# Patient Record
Sex: FEMALE | Race: WHITE | Employment: OTHER | URBAN - METROPOLITAN AREA
[De-identification: names, ages, dates, MRNs, and addresses within clinical notes are randomized per-mention and may not be internally consistent; named-entity substitution may affect disease eponyms.]

---

## 2017-11-27 ENCOUNTER — TELEPHONE (OUTPATIENT)
Dept: DERMATOLOGY | Facility: CLINIC | Age: 82
End: 2017-11-27

## 2017-11-27 NOTE — TELEPHONE ENCOUNTER
Reason for Call:  Other appointment    Detailed comments: Pt thinks she has basal cell spot on face, needs to be seen , no openings until Jan. , leaving [facility] end December, please call to advise, she is on waiting list    Phone Number Patient can be reached at: Cell number on file:    Telephone Information:   Mobile [phone]       Best Time: today    Can we leave a detailed message on this number? YES    Call taken on 11/27/2017 at 10:59 AM by Zeenat Crabtree

## 2017-11-27 NOTE — TELEPHONE ENCOUNTER
"Tried to reach patient at number below but got a message stating: \"The person you are trying to reach is not accepting calls at this time-please try your call again later..\"    Will try to work patient in but little work in openings available, may need to wait until she returns for appt. Patient did not schedule an appt and has been on wait list 2 weeks now. Diane Rai RN    "

## 2017-11-29 ENCOUNTER — OFFICE VISIT (OUTPATIENT)
Dept: DERMATOLOGY | Facility: CLINIC | Age: 82
End: 2017-11-29
Payer: COMMERCIAL

## 2017-11-29 VITALS — SYSTOLIC BLOOD PRESSURE: 159 MMHG | OXYGEN SATURATION: 98 % | DIASTOLIC BLOOD PRESSURE: 95 MMHG | HEART RATE: 60 BPM

## 2017-11-29 DIAGNOSIS — L81.4 LENTIGO: ICD-10-CM

## 2017-11-29 DIAGNOSIS — L82.0 INFLAMED SEBORRHEIC KERATOSIS: ICD-10-CM

## 2017-11-29 DIAGNOSIS — Z85.828 HISTORY OF SKIN CANCER: ICD-10-CM

## 2017-11-29 DIAGNOSIS — L57.0 AK (ACTINIC KERATOSIS): Primary | ICD-10-CM

## 2017-11-29 DIAGNOSIS — L82.1 SK (SEBORRHEIC KERATOSIS): ICD-10-CM

## 2017-11-29 PROCEDURE — 17000 DESTRUCT PREMALG LESION: CPT | Mod: 59 | Performed by: DERMATOLOGY

## 2017-11-29 PROCEDURE — 99213 OFFICE O/P EST LOW 20 MIN: CPT | Mod: 25 | Performed by: DERMATOLOGY

## 2017-11-29 PROCEDURE — 17110 DESTRUCTION B9 LES UP TO 14: CPT | Performed by: DERMATOLOGY

## 2017-11-29 NOTE — LETTER
11/29/2017         RE: Ntahalie Betts  [address]  [facility] [address]        Dear Colleague,    Thank you for referring your patient, Nathalie Betts, to the [facility]. Please see a copy of my visit note below.    Nathalie Betts is a 82 year old year old female patient here today for f/u hx of non-melanoma skin cancer.  Today she notes rouhg spot on right temple nad l thigh.   .  Patient states this has been present for a while.  Patient reports the following symptoms:  rough.  Patient reports the following previous treatments none.  Patient reports the following modifying factors none.  Associated symptoms: none.  Patient has no other skin complaints today.  Remainder of the HPI, Meds, PMH, Allergies, FH, and SH was reviewed in chart.    Pertinent Hx:   Non-melanoma skin cancer   Past Medical History:   Diagnosis Date     Squamous cell carcinoma        History reviewed. No pertinent surgical history.     Family History   Problem Relation Age of Onset     Melanoma No family hx of        Social History     Social History     Marital status:      Spouse name: N/A     Number of children: N/A     Years of education: N/A     Occupational History     Not on file.     Social History Main Topics     Smoking status: Former Smoker     Smokeless tobacco: Never Used     Alcohol use Not on file     Drug use: Not on file     Sexual activity: Not on file     Other Topics Concern     Not on file     Social History Narrative       Outpatient Encounter Prescriptions as of 11/29/2017   Medication Sig Dispense Refill     valsartan (DIOVAN) 160 MG tablet   0     simvastatin (ZOCOR) 80 MG tablet   1     omeprazole (PRILOSEC) 20 MG capsule   1     labetalol (NORMODYNE) 100 MG tablet   1     DULoxetine (CYMBALTA) 60 MG capsule   1     doxazosin (CARDURA) 4 MG tablet   1     ALPRAZolam (XANAX) 0.25 MG tablet   0     multivitamin  with lutein (OCUVITE WITH LTEIN) CAPS Take 1 capsule by mouth daily        spironolactone (ALDACTONE) 25 MG tablet   2     No facility-administered encounter medications on file as of 11/29/2017.              Review Of Systems  Skin: As above  Eyes: negative  Ears/Nose/Throat: negative  Respiratory: No shortness of breath, dyspnea on exertion, cough, or hemoptysis  Cardiovascular: negative  Gastrointestinal: negative  Genitourinary: negative  Musculoskeletal: negative  Neurologic: negative  Psychiatric: negative  Hematologic/Lymphatic/Immunologic: negative  Endocrine: negative      O:   NAD, WDWN, Alert & Oriented, Mood & Affect wnl, Vitals stable   Here today alone   BP (!) 159/95  Pulse 60  SpO2 98%   General appearance normal   Vitals stable   Alert, oriented and in no acute distress      Following lymph nodes palpated: Occipital, Cervical, Supraclavicular no lad   Stuck on papules and brown macules on trunk and ext    R temple gritty papule   L thigh inflamed seborrheic keratosis         The remainder of the full exam was unremarkable; the following areas were examined:  conjunctiva/lids, oral mucosa, neck, peripheral vascular system, abdomen, lymph nodes, digits/nails, eccrine and apocrine glands, scalp/hair, face, neck, chest, abdomen, buttocks, back, RUE, LUE, RLE, LLE       Eyes: Conjunctivae/lids:Normal     ENT: Lips, buccal mucosa, tongue: normal    MSK:Normal    Cardiovascular: peripheral edema none    Pulm: Breathing Normal    Lymph Nodes: No Head and Neck Lymphadenopathy     Neuro/Psych: Orientation:Normal; Mood/Affect:Normal      A/P:  1. Hx of non-melanoma skin cancer, seborrheic keratosis, lentigo  2. r temple actinic keratosis   LN2:  Treated with LN2 for 5s for 1-2 cycles. Warned risks of blistering, pain, pigment change, scarring, and incomplete resolution.  Advised patient to return if lesions do not completely resolve.  Wound care sheet given.  3. L thigh inflamed seborrheic keratosis   LN2:  Treated with LN2 for 5s for 1-2 cycles. Warned risks of blistering,  pain, pigment change, scarring, and incomplete resolution.  Advised patient to return if lesions do not completely resolve.  Wound care sheet given.    BENIGN LESIONS DISCUSSED WITH PATIENT:  I discussed the specifics of tumor, prognosis, and genetics of benign lesions.  I explained that treatment of these lesions would be purely cosmetic and not medically neccessary.  I discussed with patient different removal options including excision, cautery and /or laser.      Nature and genetics of benign skin lesions dicussed with patient.  Signs and Symptoms of skin cancer discussed with patient.  Patient encouraged to perform monthly skin exams.  UV precautions reviewed with patient.  Skin care regimen reviewed with patient: Eliminate harsh soaps, i.e. Dial, zest, irsih spring; Mild soaps such as Cetaphil or Dove sensitive skin, avoid hot or cold showers, aggressive use of emollients including vanicream, cetaphil or cerave discussed with patient.    Risks of non-melanoma skin cancer discussed with patient   Return to clinic 12 months      Again, thank you for allowing me to participate in the care of your patient.        Sincerely,        Cristian Nixon MD

## 2017-11-29 NOTE — MR AVS SNAPSHOT
After Visit Summary   11/29/2017    Nathalie Betts    MRN: [id number]           Patient Information     Date Of Birth          1935        Visit Information        Provider Department      11/29/2017 11:45 AM Cristian Nixon MD [facility]        Care Instructions    WOUND CARE INSTRUCTIONS   FOR CRYOSURGERY   Face & leg  This area treated with liquid nitrogen will form a blister. You do not need to bandage the area until after the blister forms and breaks (which may be a few days). When the blister breaks, begin daily dressing changes as follows:   1) Clean and dry the area with tap water using clean Q-tip or sterile gauze pad.   2) Apply Polysporin ointment or Bacitracin ointment over entire wound. Do NOT use Neosporin ointment.   3) Cover the wound with a band-aid or sterile non-stick gauze pad and micropore paper tape.   REPEAT THESE INSTRUCTIONS AT LEAST ONCE A DAY UNTIL THE WOUND HAS COMPLETELY HEALED.   It is an old wives tale that a wound heals better when it is exposed to air and allowed to dry out. The wound will heal faster with a better cosmetic result if it is kept moist with ointment and covered with a bandage.   Do not let the wound dry out.   IMPORTANT INFORMATION ON REVERSE SIDE   Supplies Needed:   *Cotton tipped applicators (Q-tips)   *Polysporin ointment or Bacitracin ointment (NOT NEOSPORIN)   *Band-aids, or non stick gauze pads and micropore paper tape   PATIENT INFORMATION   During the healing process you will notice a number of changes. All wounds develop a small halo of redness surrounding the wound. This means healing is occurring. Severe itching with extensive redness usually indicates sensitivity to the ointment or bandage tape used to dress the wound. You should call our office if this develops.   Swelling and/or discoloration around your surgical site is common, particularly when performed around the eye.   All wounds normally drain. The  larger the wound the more drainage there will be. After 7-10 days, you will notice the wound beginning to shrink and new skin will begin to grow. The wound is healed when you can see skin has formed over the entire area. A healed wound has a healthy, shiny look to the surface and is red to dark pink in color to normalize. Wounds may take approximately 4-6 weeks to heal. Larger wounds may take 6-8 weeks. After the wound is healed you may discontinue dressing changes.   You may experience a sensation of tightness as your wound heals. This is normal and will gradually subside.   Your healed wound may be sensitive to temperature changes. This sensitivity improves with time, but if you re having a lot of discomfort, try to avoid temperature extremes.   Patients frequently experience itching after their wound appears to have healed because of the continue healing under the skin. Plain Vaseline will help relieve the itching.               Follow-ups after your visit        Who to contact     If you have questions or need follow up information about today's clinic visit or your schedule please contact [facility] directly at [phone].  Normal or non-critical lab and imaging results will be communicated to you by [email or web address], letter or phone within 4 business days after the clinic has received the results. If you do not hear from us within 7 days, please contact the clinic through [employer] or phone. If you have a critical or abnormal lab result, we will notify you by phone as soon as possible.  Submit refill requests through [employer] or call your pharmacy and they will forward the refill request to us. Please allow 3 business days for your refill to be completed.          Additional Information About Your Visit        [employer] Information     [facility] lets you send messages to your doctor, view your test results, renew your prescriptions, schedule appointments and more. To sign up, go to [email or web address] .  "Click on \"Log in\" on the left side of the screen, which will take you to the Welcome page. Then click on \"Sign up Now\" on the right side of the page.     You will be asked to enter the access code listed below, as well as some personal information. Please follow the directions to create your username and password.     Your access code is: [email or web address]  Expires: 2018 12:06 PM     Your access code will  in 90 days. If you need help or a new code, please call your [facility] clinic or [phone].        Care EveryWhere ID     This is your Care EveryWhere ID. This could be used by other organizations to access your [facility] medical records  [id number]        Your Vitals Were     Pulse Pulse Oximetry                60 98%           Blood Pressure from Last 3 Encounters:   17 (!) 159/95   16 149/83   12/08/15 144/88    Weight from Last 3 Encounters:   No data found for Wt              Today, you had the following     No orders found for display       Primary Care Provider Office Phone # Fax #    Christine Escalera [phone]       [place] [id number]        Equal Access to Services     KOBE WEBB : Hadii aad ku hadasho Soomaali, waaxda luqadaha, qaybta kaalmada adeegyada, waxay lizette katen smith vegas. So [facility] [phone].    ATENCIÓN: Si habla español, tiene a garnett disposición servicios gratuitos de asistencia lingüística. Llame al [phone].    We comply with applicable federal civil rights laws and [place] laws. We do not discriminate on the basis of race, color, national origin, age, disability, sex, sexual orientation, or gender identity.            Thank you!     Thank you for choosing [facility]  for your care. Our goal is always to provide you with excellent care. Hearing back from our patients is one way we can continue to improve our services. Please take a few minutes to complete the " written survey that you may receive in the mail after your visit with us. Thank you!             Your Updated Medication List - Protect others around you: Learn how to safely use, store and throw away your medicines at www.disposemymeds.org.          This list is accurate as of: 11/29/17 12:06 PM.  Always use your most recent med list.                   Brand Name Dispense Instructions for use Diagnosis    ALPRAZolam 0.25 MG tablet    XANAX          doxazosin 4 MG tablet    CARDURA          DULoxetine 60 MG EC capsule    CYMBALTA          labetalol 100 MG tablet    NORMODYNE          multivitamin  with lutein Caps per capsule      Take 1 capsule by mouth daily        omeprazole 20 MG CR capsule    priLOSEC          simvastatin 80 MG tablet    ZOCOR          spironolactone 25 MG tablet    ALDACTONE          valsartan 160 MG tablet    DIOVAN

## 2017-11-29 NOTE — PATIENT INSTRUCTIONS
WOUND CARE INSTRUCTIONS   FOR CRYOSURGERY   Face & leg  This area treated with liquid nitrogen will form a blister. You do not need to bandage the area until after the blister forms and breaks (which may be a few days). When the blister breaks, begin daily dressing changes as follows:   1) Clean and dry the area with tap water using clean Q-tip or sterile gauze pad.   2) Apply Polysporin ointment or Bacitracin ointment over entire wound. Do NOT use Neosporin ointment.   3) Cover the wound with a band-aid or sterile non-stick gauze pad and micropore paper tape.   REPEAT THESE INSTRUCTIONS AT LEAST ONCE A DAY UNTIL THE WOUND HAS COMPLETELY HEALED.   It is an old wives tale that a wound heals better when it is exposed to air and allowed to dry out. The wound will heal faster with a better cosmetic result if it is kept moist with ointment and covered with a bandage.   Do not let the wound dry out.   IMPORTANT INFORMATION ON REVERSE SIDE   Supplies Needed:   *Cotton tipped applicators (Q-tips)   *Polysporin ointment or Bacitracin ointment (NOT NEOSPORIN)   *Band-aids, or non stick gauze pads and micropore paper tape   PATIENT INFORMATION   During the healing process you will notice a number of changes. All wounds develop a small halo of redness surrounding the wound. This means healing is occurring. Severe itching with extensive redness usually indicates sensitivity to the ointment or bandage tape used to dress the wound. You should call our office if this develops.   Swelling and/or discoloration around your surgical site is common, particularly when performed around the eye.   All wounds normally drain. The larger the wound the more drainage there will be. After 7-10 days, you will notice the wound beginning to shrink and new skin will begin to grow. The wound is healed when you can see skin has formed over the entire area. A healed wound has a healthy, shiny look to the surface and is red to dark pink in color to  normalize. Wounds may take approximately 4-6 weeks to heal. Larger wounds may take 6-8 weeks. After the wound is healed you may discontinue dressing changes.   You may experience a sensation of tightness as your wound heals. This is normal and will gradually subside.   Your healed wound may be sensitive to temperature changes. This sensitivity improves with time, but if you re having a lot of discomfort, try to avoid temperature extremes.   Patients frequently experience itching after their wound appears to have healed because of the continue healing under the skin. Plain Vaseline will help relieve the itching.

## 2017-11-29 NOTE — PROGRESS NOTES
Nathalie Betts is a 82 year old year old female patient here today for f/u hx of non-melanoma skin cancer.  Today she notes rouhg spot on right temple nad l thigh.   .  Patient states this has been present for a while.  Patient reports the following symptoms:  rough.  Patient reports the following previous treatments none.  Patient reports the following modifying factors none.  Associated symptoms: none.  Patient has no other skin complaints today.  Remainder of the HPI, Meds, PMH, Allergies, FH, and SH was reviewed in chart.    Pertinent Hx:   Non-melanoma skin cancer   Past Medical History:   Diagnosis Date     Squamous cell carcinoma        History reviewed. No pertinent surgical history.     Family History   Problem Relation Age of Onset     Melanoma No family hx of        Social History     Social History     Marital status:      Spouse name: N/A     Number of children: N/A     Years of education: N/A     Occupational History     Not on file.     Social History Main Topics     Smoking status: Former Smoker     Smokeless tobacco: Never Used     Alcohol use Not on file     Drug use: Not on file     Sexual activity: Not on file     Other Topics Concern     Not on file     Social History Narrative       Outpatient Encounter Prescriptions as of 11/29/2017   Medication Sig Dispense Refill     valsartan (DIOVAN) 160 MG tablet   0     simvastatin (ZOCOR) 80 MG tablet   1     omeprazole (PRILOSEC) 20 MG capsule   1     labetalol (NORMODYNE) 100 MG tablet   1     DULoxetine (CYMBALTA) 60 MG capsule   1     doxazosin (CARDURA) 4 MG tablet   1     ALPRAZolam (XANAX) 0.25 MG tablet   0     multivitamin  with lutein (OCUVITE WITH LTEIN) CAPS Take 1 capsule by mouth daily       spironolactone (ALDACTONE) 25 MG tablet   2     No facility-administered encounter medications on file as of 11/29/2017.              Review Of Systems  Skin: As above  Eyes: negative  Ears/Nose/Throat: negative  Respiratory: No shortness of  breath, dyspnea on exertion, cough, or hemoptysis  Cardiovascular: negative  Gastrointestinal: negative  Genitourinary: negative  Musculoskeletal: negative  Neurologic: negative  Psychiatric: negative  Hematologic/Lymphatic/Immunologic: negative  Endocrine: negative      O:   NAD, WDWN, Alert & Oriented, Mood & Affect wnl, Vitals stable   Here today alone   BP (!) 159/95  Pulse 60  SpO2 98%   General appearance normal   Vitals stable   Alert, oriented and in no acute distress      Following lymph nodes palpated: Occipital, Cervical, Supraclavicular no lad   Stuck on papules and brown macules on trunk and ext    R temple gritty papule   L thigh inflamed seborrheic keratosis         The remainder of the full exam was unremarkable; the following areas were examined:  conjunctiva/lids, oral mucosa, neck, peripheral vascular system, abdomen, lymph nodes, digits/nails, eccrine and apocrine glands, scalp/hair, face, neck, chest, abdomen, buttocks, back, RUE, LUE, RLE, LLE       Eyes: Conjunctivae/lids:Normal     ENT: Lips, buccal mucosa, tongue: normal    MSK:Normal    Cardiovascular: peripheral edema none    Pulm: Breathing Normal    Lymph Nodes: No Head and Neck Lymphadenopathy     Neuro/Psych: Orientation:Normal; Mood/Affect:Normal      A/P:  1. Hx of non-melanoma skin cancer, seborrheic keratosis, lentigo  2. r temple actinic keratosis   LN2:  Treated with LN2 for 5s for 1-2 cycles. Warned risks of blistering, pain, pigment change, scarring, and incomplete resolution.  Advised patient to return if lesions do not completely resolve.  Wound care sheet given.  3. L thigh inflamed seborrheic keratosis   LN2:  Treated with LN2 for 5s for 1-2 cycles. Warned risks of blistering, pain, pigment change, scarring, and incomplete resolution.  Advised patient to return if lesions do not completely resolve.  Wound care sheet given.    BENIGN LESIONS DISCUSSED WITH PATIENT:  I discussed the specifics of tumor, prognosis, and  genetics of benign lesions.  I explained that treatment of these lesions would be purely cosmetic and not medically neccessary.  I discussed with patient different removal options including excision, cautery and /or laser.      Nature and genetics of benign skin lesions dicussed with patient.  Signs and Symptoms of skin cancer discussed with patient.  Patient encouraged to perform monthly skin exams.  UV precautions reviewed with patient.  Skin care regimen reviewed with patient: Eliminate harsh soaps, i.e. Dial, zest, irsih spring; Mild soaps such as Cetaphil or Dove sensitive skin, avoid hot or cold showers, aggressive use of emollients including vanicream, cetaphil or cerave discussed with patient.    Risks of non-melanoma skin cancer discussed with patient   Return to clinic 12 months

## 2020-02-27 ENCOUNTER — NEW PATIENT (OUTPATIENT)
Dept: URBAN - METROPOLITAN AREA CLINIC 60 | Facility: CLINIC | Age: 85
End: 2020-02-27
Payer: MEDICARE

## 2020-02-27 DIAGNOSIS — H35.3132 NONEXUDATIVE MACULAR DEGENERATION, INTERMEDIATE DRY STAGE, BILATERAL: Primary | ICD-10-CM

## 2020-02-27 DIAGNOSIS — Z96.1 PRESENCE OF INTRAOCULAR LENS: ICD-10-CM

## 2020-02-27 DIAGNOSIS — H40.013 OPEN ANGLE WITH BORDERLINE FINDINGS, LOW RISK, BILATERAL: ICD-10-CM

## 2020-02-27 DIAGNOSIS — H43.813 VITREOUS DEGENERATION, BILATERAL: ICD-10-CM

## 2020-02-27 PROCEDURE — 92004 COMPRE OPH EXAM NEW PT 1/>: CPT | Performed by: OPTOMETRIST

## 2020-02-27 PROCEDURE — 92134 CPTRZ OPH DX IMG PST SGM RTA: CPT | Performed by: OPTOMETRIST

## 2020-02-27 PROCEDURE — 92133 CPTRZD OPH DX IMG PST SGM ON: CPT | Performed by: OPTOMETRIST

## 2020-02-27 ASSESSMENT — INTRAOCULAR PRESSURE
OS: 18
OD: 15

## 2020-02-27 ASSESSMENT — VISUAL ACUITY
OD: 20/30
OS: 20/40

## 2021-08-31 ENCOUNTER — LAB REQUISITION (OUTPATIENT)
Dept: LAB | Facility: CLINIC | Age: 86
End: 2021-08-31
Payer: MEDICARE

## 2021-08-31 DIAGNOSIS — N39.0 URINARY TRACT INFECTION, SITE NOT SPECIFIED: ICD-10-CM

## 2021-08-31 PROBLEM — F41.9 ANXIETY AND DEPRESSION: Status: ACTIVE | Noted: 2019-08-10

## 2021-08-31 PROBLEM — S52.90XA RADIAL FRACTURE: Status: ACTIVE | Noted: 2021-08-21

## 2021-08-31 PROBLEM — M19.90 OSTEOARTHRITIS: Status: ACTIVE | Noted: 2021-08-31

## 2021-08-31 PROBLEM — I77.9 AORTA DISORDER (H): Status: ACTIVE | Noted: 2020-07-21

## 2021-08-31 PROBLEM — I47.10 SUPRAVENTRICULAR TACHYCARDIA (H): Status: ACTIVE | Noted: 2020-07-21

## 2021-08-31 PROBLEM — Z86.73 HISTORY OF CVA (CEREBROVASCULAR ACCIDENT): Status: ACTIVE | Noted: 2021-07-14

## 2021-08-31 PROBLEM — O36.0190 ANTI-D ANTIBODIES PRESENT: Status: ACTIVE | Noted: 2021-04-26

## 2021-08-31 PROBLEM — F99 PSYCHIATRIC PROBLEM: Status: ACTIVE | Noted: 2018-01-16

## 2021-08-31 PROBLEM — G47.30 SLEEP APNEA: Status: ACTIVE | Noted: 2017-09-05

## 2021-08-31 PROBLEM — K57.30 DIVERTICULOSIS OF COLON: Status: ACTIVE | Noted: 2021-08-31

## 2021-08-31 PROBLEM — S22.39XA RIB FRACTURE: Status: ACTIVE | Noted: 2021-08-21

## 2021-08-31 PROBLEM — E21.0 PRIMARY HYPERPARATHYROIDISM (H): Status: ACTIVE | Noted: 2021-08-07

## 2021-08-31 PROBLEM — I20.89 STABLE ANGINA (H): Status: ACTIVE | Noted: 2021-01-18

## 2021-08-31 PROBLEM — K21.9 GASTROESOPHAGEAL REFLUX DISEASE: Status: ACTIVE | Noted: 2017-09-05

## 2021-08-31 PROBLEM — K82.9 GALLBLADDER DISEASE: Status: ACTIVE | Noted: 2017-09-05

## 2021-08-31 PROBLEM — I63.9 ACUTE ISCHEMIC STROKE (H): Status: ACTIVE | Noted: 2021-05-02

## 2021-08-31 PROBLEM — M16.11 OSTEOARTHRITIS OF RIGHT HIP: Status: ACTIVE | Noted: 2017-10-06

## 2021-08-31 PROBLEM — I27.20 PULMONARY HYPERTENSION (H): Status: ACTIVE | Noted: 2017-09-25

## 2021-08-31 PROBLEM — F33.0 MILD EPISODE OF RECURRENT MAJOR DEPRESSIVE DISORDER (H): Status: ACTIVE | Noted: 2020-07-21

## 2021-08-31 PROBLEM — K44.9 HIATAL HERNIA: Status: ACTIVE | Noted: 2017-09-05

## 2021-08-31 PROBLEM — F32.A ANXIETY AND DEPRESSION: Status: ACTIVE | Noted: 2019-08-10

## 2021-08-31 PROBLEM — K83.8 DILATION OF BILIARY TRACT: Status: ACTIVE | Noted: 2021-08-24

## 2021-08-31 PROBLEM — H53.9 VISUAL DISTURBANCE: Status: ACTIVE | Noted: 2017-09-05

## 2021-08-31 PROBLEM — M48.00 SPINAL STENOSIS: Status: ACTIVE | Noted: 2017-09-05
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
## 2021-08-31 PROBLEM — R52 UNCONTROLLED PAIN: Status: ACTIVE | Noted: 2021-08-24

## 2021-08-31 PROBLEM — M41.9 SCOLIOSIS: Status: ACTIVE | Noted: 2017-09-05

## 2021-08-31 NOTE — PROGRESS NOTES
[facility] GERIATRIC SERVICES  Primary Care Provider & Clinic: LEX DEMPSEY MD, [facility] [address] / [place] [address]; Phone: [phone]  Chief Complaint   Patient presents with     Hospital F/U     [facility] 08/24/2021 - 08/26/2021; 08/21/2021 - 08/22/2021     [facility] Medical Record Number: [id number]  Place of Service Where Encounter Took Place: [facility] AT [facility] ([facility]) [[id number]]    Nathalie Betts is a 86 year old (1935), admitted to the above facility from  [facility]. Hospital stay 8/24/21 through 8/26/21.    HPI:    Patient with a fall at home on 8/25/21 and sustained left sided rib fractures. TCU was recommended patient patient declined. Patient returned to the ER due to severe pain and inability to care for herself.     Patient had a CVA May '21. Has completed in patient and home therapy. Recent cognitive testing with [facility] 28/30.     Staff report patient rating pain moderate to severe and asking to increase oxycodone frequency. Patient saw ortho one day ago and left wrist casted. Today patient rating pain 7/10 on left side/ribs. No dyspnea or cough. Patient notes increased constipation with narcotics, no BM x 3 days. Denies abd pain.     CODE STATUS/ADVANCE DIRECTIVES DISCUSSION: No Order - CPR/Full code   Patient's living condition: lives alone  ALLERGIES:   Allergies   Allergen Reactions     Penicillins Anaphylaxis     Atorvastatin Muscle Pain (Myalgia)     Diatrizoate Other (See Comments)     Elevated BP     Mushroom Unknown     Nifedipine Hives      PAST MEDICAL HISTORY:   Past Medical History:   Diagnosis Date     Squamous cell carcinoma       PAST SURGICAL HISTORY:  has no past surgical history on file.  FAMILY HISTORY: family history is not on file.  SOCIAL HISTORY:  reports that she has quit smoking. She has never used smokeless tobacco.    Post Discharge Medication Reconciliation Status: discharge medications  reconciled, continue medications without change  Current Outpatient Medications   Medication Sig     acetaminophen (TYLENOL) 500 MG tablet Take 1,000 mg by mouth 3 times daily     ALPRAZolam (XANAX) 0.25 MG tablet Take 0.5 mg by mouth 2 times daily      amLODIPine (NORVASC) 10 MG tablet Take 10 mg by mouth daily     apixaban ANTICOAGULANT (ELIQUIS) 5 MG tablet Take 5 mg by mouth 2 times daily     baclofen (LIORESAL) 10 MG tablet Take 10 mg by mouth every 8 hours as needed for muscle spasms     calcium carbonate (OS-MARY) 1500 (600 Ca) MG tablet Take 600 mg by mouth daily     carboxymethylcellulose PF (REFRESH PLUS) 0.5 % ophthalmic solution Place 1 drop into both eyes daily as needed for dry eyes     carvedilol (COREG) 3.125 MG tablet Take 3.125 mg by mouth 2 times daily (with meals)     clindamycin (CLEOCIN) 150 MG capsule Take 600 mg by mouth once as needed Take 4 capsules (600 mg) 1 hour prior to dental appointment     DULoxetine (CYMBALTA) 60 MG capsule Take 60 mg by mouth daily      Lidocaine (LIDOCARE) 4 % Patch Place 1 patch onto the skin every 24 hours To prevent lidocaine toxicity, patient should be patch free for 12 hrs daily.     losartan (COZAAR) 100 MG tablet Take 100 mg by mouth daily     Lutein 20 MG CAPS Take 1 capsule by mouth 2 times daily     melatonin 5 MG tablet Take 10 mg by mouth At Bedtime     mirtazapine (REMERON) 15 MG tablet Take 15 mg by mouth At Bedtime     Multiple Vitamins-Minerals (CENTRUM WOMEN) TABS Take 1 tablet by mouth daily     nitroGLYcerin (NITROSTAT) 0.4 MG sublingual tablet Place 0.4 mg under the tongue every 5 minutes as needed for chest pain For chest pain place 1 tablet under the tongue every 5 minutes for 3 doses. If symptoms persist 5 minutes after 1st dose call 911.     omeprazole (PRILOSEC) 20 MG capsule Take 20 mg by mouth daily before breakfast      oxyCODONE (ROXICODONE) 5 MG tablet Take 2.5 mg by mouth every 6 hours as needed for pain     polyethylene glycol 3350  POWD Take 17 g by mouth daily as needed     rosuvastatin (CRESTOR) 40 MG tablet Take 40 mg by mouth At Bedtime     Vitamin D, Cholecalciferol, 25 MCG (1000 UT) TABS Take 1 tablet by mouth daily     No current facility-administered medications for this visit.     ROS:  4 point ROS including Respiratory, CV, GI and , other than that noted in the HPI,  is negative    Vitals:  BP (!) 159/91   Pulse 66   Temp 97.3  F (36.3  C)   Resp 18   Ht 1.524 m (5')   Wt 67.9 kg (149 lb 12.8 oz)   SpO2 98%   BMI 29.26 kg/m    Exam:  GENERAL APPEARANCE:  Alert, in no distress  RESP:  lungs clear to auscultation , no respiratory distress  CV:  regular rate and rhythm, no murmur, rub, or gallop  ABDOMEN:  normal bowel sounds, soft, nontender, no hepatosplenomegaly or other masses  SKIN:  left arm in cast, cms to fingers intact  PSYCH:  oriented X 3, affect and mood normal    Lab/Diagnostic data:  Recent labs in [facility] reviewed by me today.     ASSESSMENT/PLAN:  Closed fracture of distal end of left radius with routine healing, unspecified fracture morphology, subsequent encounter  Closed fracture of one rib of left side with routine healing, subsequent encounter  - saw orto one day ago, cast to left forearm  - having rib pain,continue scheduled tylenol. Will increse oxycodone from 2.5 to 5 mg Q6 prn due to uncontrolled pain  - patient working with PT    Anxiety and depression  - chronic. Continue xanax, Cymbalta and remeron    Gastroesophageal reflux disease with esophagitis without hemorrhage  - sx's stable, continue PPI    History of CVA (cerebrovascular accident)  - following with neurology  - treated with TPA initially, then dual antiplatelet therapy. Afib noted in June (now in SR), patient to continue eliquis and asa per neurology    Stable angina (H)  Essential hypertension  - has used ntg 1-2 times in the last year  -  Continue coreg and norvasc  - staff to update with any reports of CP    Slow transit constipation  - due  to narcotics, patient has miralax available, she does not want to have bowel meds scheduled at this time    Electronically signed by: BETHANY Armas CNP

## 2021-09-01 LAB
ALBUMIN UR-MCNC: NORMAL G/DL
APPEARANCE UR: NORMAL
BILIRUB UR QL STRIP: NORMAL
COLOR UR AUTO: NORMAL
GLUCOSE UR STRIP-MCNC: NORMAL MG/DL
HGB UR QL STRIP: NORMAL
KETONES UR STRIP-MCNC: NORMAL MG/DL
LEUKOCYTE ESTERASE UR QL STRIP: NORMAL
MUCOUS THREADS #/AREA URNS LPF: NORMAL /[LPF]
NITRATE UR QL: NORMAL
PH UR STRIP: NORMAL [PH]
RBC URINE: NORMAL
SP GR UR STRIP: NORMAL
SQUAMOUS EPITHELIAL: NORMAL
UROBILINOGEN UR STRIP-MCNC: NORMAL MG/DL
WBC URINE: NORMAL

## 2021-09-02 ENCOUNTER — TRANSITIONAL CARE UNIT VISIT (OUTPATIENT)
Dept: GERIATRICS | Facility: CLINIC | Age: 86
End: 2021-09-02
Payer: MEDICARE

## 2021-09-02 VITALS
RESPIRATION RATE: 18 BRPM | OXYGEN SATURATION: 98 % | DIASTOLIC BLOOD PRESSURE: 91 MMHG | BODY MASS INDEX: 29.41 KG/M2 | HEART RATE: 66 BPM | WEIGHT: 149.8 LBS | SYSTOLIC BLOOD PRESSURE: 159 MMHG | TEMPERATURE: 97.3 F | HEIGHT: 60 IN

## 2021-09-02 DIAGNOSIS — I10 ESSENTIAL HYPERTENSION: ICD-10-CM

## 2021-09-02 DIAGNOSIS — K59.01 SLOW TRANSIT CONSTIPATION: ICD-10-CM

## 2021-09-02 DIAGNOSIS — S22.32XD CLOSED FRACTURE OF ONE RIB OF LEFT SIDE WITH ROUTINE HEALING, SUBSEQUENT ENCOUNTER: ICD-10-CM

## 2021-09-02 DIAGNOSIS — S52.502D CLOSED FRACTURE OF DISTAL END OF LEFT RADIUS WITH ROUTINE HEALING, UNSPECIFIED FRACTURE MORPHOLOGY, SUBSEQUENT ENCOUNTER: Primary | ICD-10-CM

## 2021-09-02 DIAGNOSIS — F41.9 ANXIETY AND DEPRESSION: ICD-10-CM

## 2021-09-02 DIAGNOSIS — K21.00 GASTROESOPHAGEAL REFLUX DISEASE WITH ESOPHAGITIS WITHOUT HEMORRHAGE: ICD-10-CM

## 2021-09-02 DIAGNOSIS — Z86.73 HISTORY OF CVA (CEREBROVASCULAR ACCIDENT): ICD-10-CM

## 2021-09-02 DIAGNOSIS — F32.A ANXIETY AND DEPRESSION: ICD-10-CM

## 2021-09-02 DIAGNOSIS — I20.89 STABLE ANGINA (H): ICD-10-CM

## 2021-09-02 PROCEDURE — 99309 SBSQ NF CARE MODERATE MDM 30: CPT | Performed by: NURSE PRACTITIONER

## 2021-09-02 RX ORDER — OXYCODONE HYDROCHLORIDE 5 MG/1
5 TABLET ORAL EVERY 6 HOURS PRN
Qty: 30 TABLET | Refills: 0 | Status: SHIPPED | OUTPATIENT
Start: 2021-09-02 | End: 2021-09-05

## 2021-09-02 RX ORDER — CARBOXYMETHYLCELLULOSE SODIUM 5 MG/ML
1 SOLUTION/ DROPS OPHTHALMIC DAILY PRN
COMMUNITY
Start: 2021-09-02

## 2021-09-02 RX ORDER — AMLODIPINE BESYLATE 10 MG/1
10 TABLET ORAL DAILY
COMMUNITY
Start: 2021-09-02

## 2021-09-02 RX ORDER — LOSARTAN POTASSIUM 100 MG/1
100 TABLET ORAL DAILY
COMMUNITY
Start: 2021-09-02

## 2021-09-02 RX ORDER — CLINDAMYCIN HCL 150 MG
600 CAPSULE ORAL
COMMUNITY
Start: 2021-09-02

## 2021-09-02 RX ORDER — SPIRONOLACTONE 25 MG
1 TABLET ORAL 2 TIMES DAILY
COMMUNITY
Start: 2021-09-02

## 2021-09-02 RX ORDER — ACETAMINOPHEN 500 MG
1000 TABLET ORAL 3 TIMES DAILY
COMMUNITY
Start: 2021-09-02

## 2021-09-02 RX ORDER — MULTIVIT-MIN/IRON/FOLIC ACID/K 18-600-40
1 CAPSULE ORAL DAILY
COMMUNITY
Start: 2021-09-02

## 2021-09-02 RX ORDER — LIDOCAINE 4 G/G
1 PATCH TOPICAL EVERY 24 HOURS
COMMUNITY
Start: 2021-09-02

## 2021-09-02 RX ORDER — CARVEDILOL 3.12 MG/1
3.12 TABLET ORAL 2 TIMES DAILY WITH MEALS
COMMUNITY
Start: 2021-09-02

## 2021-09-02 RX ORDER — POLYETHYLENE GLYCOL 3350
17 POWDER (GRAM) MISCELLANEOUS DAILY PRN
COMMUNITY
Start: 2021-09-02

## 2021-09-02 RX ORDER — NITROGLYCERIN 0.4 MG/1
0.4 TABLET SUBLINGUAL EVERY 5 MIN PRN
COMMUNITY
Start: 2021-09-02

## 2021-09-02 RX ORDER — ROSUVASTATIN CALCIUM 40 MG/1
40 TABLET, COATED ORAL AT BEDTIME
COMMUNITY
Start: 2021-09-02

## 2021-09-02 RX ORDER — BACLOFEN 10 MG/1
10 TABLET ORAL EVERY 8 HOURS PRN
COMMUNITY
Start: 2021-09-02

## 2021-09-02 RX ORDER — MIRTAZAPINE 15 MG/1
15 TABLET, FILM COATED ORAL AT BEDTIME
COMMUNITY
Start: 2021-09-02

## 2021-09-02 RX ORDER — OXYCODONE HYDROCHLORIDE 5 MG/1
2.5 TABLET ORAL EVERY 6 HOURS PRN
COMMUNITY
Start: 2021-09-02 | End: 2021-09-10

## 2021-09-02 ASSESSMENT — MIFFLIN-ST. JEOR: SCORE: 1045.99

## 2021-09-02 NOTE — LETTER
9/2/2021        RE: Nathalie Betts  [address] [facility] [place] [address]        [facility] GERIATRIC [facility]  Primary Care Provider & Clinic: LEX DEMPSEY MD, [facility] [address] / [place] [address]; Phone: [phone]  Chief Complaint   Patient presents with     Hospital F/U     [facility] 08/24/2021 - 08/26/2021; 08/21/2021 - 08/22/2021     [place] Medical Record Number: [id number]  Place of Service Where Encounter Took Place: [facility] ([facility]) [[id number]]    Nathalie Betts is a 86 year old (1935), admitted to the above facility from  [facility]. Hospital stay 8/24/21 through 8/26/21.    HPI:    Patient with a fall at home on 8/25/21 and sustained left sided rib fractures. TCU was recommended patient patient declined. Patient returned to the ER due to severe pain and inability to care for herself.     Patient had a CVA May '21. Has completed in patient and home therapy. Recent cognitive testing with [facility] 28/30.     Staff report patient rating pain moderate to severe and asking to increase oxycodone frequency. Patient saw ortho one day ago and left wrist casted. Today patient rating pain 7/10 on left side/ribs. No dyspnea or cough. Patient notes increased constipation with narcotics, no BM x 3 days. Denies abd pain.     CODE STATUS/ADVANCE DIRECTIVES DISCUSSION: No Order - CPR/Full code   Patient's living condition: lives alone  ALLERGIES:   Allergies   Allergen Reactions     Penicillins Anaphylaxis     Atorvastatin Muscle Pain (Myalgia)     Diatrizoate Other (See Comments)     Elevated BP     Mushroom Unknown     Nifedipine Hives      PAST MEDICAL HISTORY:   Past Medical History:   Diagnosis Date     Squamous cell carcinoma       PAST SURGICAL HISTORY:  has no past surgical history on file.  FAMILY HISTORY: family history is not on file.  SOCIAL HISTORY:  reports that she has quit smoking. She has never used  smokeless tobacco.    Post Discharge Medication Reconciliation Status: discharge medications reconciled, continue medications without change  Current Outpatient Medications   Medication Sig     acetaminophen (TYLENOL) 500 MG tablet Take 1,000 mg by mouth 3 times daily     ALPRAZolam (XANAX) 0.25 MG tablet Take 0.5 mg by mouth 2 times daily      amLODIPine (NORVASC) 10 MG tablet Take 10 mg by mouth daily     apixaban ANTICOAGULANT (ELIQUIS) 5 MG tablet Take 5 mg by mouth 2 times daily     baclofen (LIORESAL) 10 MG tablet Take 10 mg by mouth every 8 hours as needed for muscle spasms     calcium carbonate (OS-MARY) 1500 (600 Ca) MG tablet Take 600 mg by mouth daily     carboxymethylcellulose PF (REFRESH PLUS) 0.5 % ophthalmic solution Place 1 drop into both eyes daily as needed for dry eyes     carvedilol (COREG) 3.125 MG tablet Take 3.125 mg by mouth 2 times daily (with meals)     clindamycin (CLEOCIN) 150 MG capsule Take 600 mg by mouth once as needed Take 4 capsules (600 mg) 1 hour prior to dental appointment     DULoxetine (CYMBALTA) 60 MG capsule Take 60 mg by mouth daily      Lidocaine (LIDOCARE) 4 % Patch Place 1 patch onto the skin every 24 hours To prevent lidocaine toxicity, patient should be patch free for 12 hrs daily.     losartan (COZAAR) 100 MG tablet Take 100 mg by mouth daily     Lutein 20 MG CAPS Take 1 capsule by mouth 2 times daily     melatonin 5 MG tablet Take 10 mg by mouth At Bedtime     mirtazapine (REMERON) 15 MG tablet Take 15 mg by mouth At Bedtime     Multiple Vitamins-Minerals (CENTRUM WOMEN) TABS Take 1 tablet by mouth daily     nitroGLYcerin (NITROSTAT) 0.4 MG sublingual tablet Place 0.4 mg under the tongue every 5 minutes as needed for chest pain For chest pain place 1 tablet under the tongue every 5 minutes for 3 doses. If symptoms persist 5 minutes after 1st dose call 911.     omeprazole (PRILOSEC) 20 MG capsule Take 20 mg by mouth daily before breakfast      oxyCODONE (ROXICODONE) 5  MG tablet Take 2.5 mg by mouth every 6 hours as needed for pain     polyethylene glycol 3350 POWD Take 17 g by mouth daily as needed     rosuvastatin (CRESTOR) 40 MG tablet Take 40 mg by mouth At Bedtime     Vitamin D, Cholecalciferol, 25 MCG (1000 UT) TABS Take 1 tablet by mouth daily     No current facility-administered medications for this visit.     ROS:  4 point ROS including Respiratory, CV, GI and , other than that noted in the HPI,  is negative    Vitals:  BP (!) 159/91   Pulse 66   Temp 97.3  F (36.3  C)   Resp 18   Ht 1.524 m (5')   Wt 67.9 kg (149 lb 12.8 oz)   SpO2 98%   BMI 29.26 kg/m    Exam:  GENERAL APPEARANCE:  Alert, in no distress  RESP:  lungs clear to auscultation , no respiratory distress  CV:  regular rate and rhythm, no murmur, rub, or gallop  ABDOMEN:  normal bowel sounds, soft, nontender, no hepatosplenomegaly or other masses  SKIN:  left arm in cast, cms to fingers intact  PSYCH:  oriented X 3, affect and mood normal    Lab/Diagnostic data:  Recent labs in [facility] reviewed by me today.     ASSESSMENT/PLAN:  Closed fracture of distal end of left radius with routine healing, unspecified fracture morphology, subsequent encounter  Closed fracture of one rib of left side with routine healing, subsequent encounter  - saw orto one day ago, cast to left forearm  - having rib pain,continue scheduled tylenol. Will increse oxycodone from 2.5 to 5 mg Q6 prn due to uncontrolled pain  - patient working with PT    Anxiety and depression  - chronic. Continue xanax, Cymbalta and remeron    Gastroesophageal reflux disease with esophagitis without hemorrhage  - sx's stable, continue PPI    History of CVA (cerebrovascular accident)  - following with neurology  - treated with TPA initially, then dual antiplatelet therapy. Afib noted in June (now in SR), patient to continue eliquis and asa per neurology    Stable angina (H)  Essential hypertension  - has used ntg 1-2 times in the last year  -  Continue  coreg and [facility]  - staff to update with any reports of CP    Slow transit constipation  - due to narcotics, patient has miralax available, she does not want to have bowel meds scheduled at this time    Electronically signed by: BETHANY Armas CNP        Sincerely,        BETHANY Armas CNP

## 2021-09-03 ENCOUNTER — DOCUMENTATION ONLY (OUTPATIENT)
Dept: OTHER | Facility: CLINIC | Age: 86
End: 2021-09-03

## 2021-09-07 VITALS
TEMPERATURE: 98.1 F | SYSTOLIC BLOOD PRESSURE: 142 MMHG | OXYGEN SATURATION: 97 % | BODY MASS INDEX: 29.49 KG/M2 | HEART RATE: 57 BPM | DIASTOLIC BLOOD PRESSURE: 82 MMHG | HEIGHT: 60 IN | WEIGHT: 150.2 LBS | RESPIRATION RATE: 18 BRPM

## 2021-09-07 ASSESSMENT — MIFFLIN-ST. JEOR: SCORE: 1047.8

## 2021-09-07 NOTE — PROGRESS NOTES
[facility] GERIATRIC SERVICES  PRIMARY CARE PROVIDER AND CLINIC:  LEX DEMPSEY MD, [facility] at  [address] / [place] [address]  Chief Complaint   Patient presents with     Hospital F/U     [facility] Medical Record Number:  [id number]  Place of Service where encounter took place:  [facility] ([facility]) [[id number]]    Nathalie Betts  is a 86 year old  (1935), admitted to the above facility from  [facility]. Hospital stay 8/30/21 through 8/30/21..  Admitted to this facility for  rehab, medical management and nursing care.    HPI:    HPI information obtained from: facility chart records, facility staff, patient report and [facility] chart review. Received verbal consent to use Care Everywhere in order to access labs, documents, histories, and all other needed information to provide care at current facility.    Brief Summary of Hospital Course:   - Pt with PMH notable for  A-fib on OAC, osteoporosis and primary hyperparathyroidism.     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     8/24/21-8/26/21:presented with intractable, pain, hospitalized for optimal analgesia. CT showed Rib 6 and 7 fx. Patient declined TCU admission, hence dismissed home.     8/30/21: presented to ED with inability to care for herself.       Today:  - Rib fx: pt reports it is sore, and very painful with touch, getting better, has to sleep on her back and puts her arm on the pillows. Reports breathing is better now, and she is getting used to her ribs.   - L radius fx: reports was getting therapy on left arm from her stroke, now this am has a pain in her left shoulder, but unsure if this because of her left forearm handing down  with cast, also added had rotator cuff injury in the past.   - RN reports that patient religously has been asking for oxycodone every 6 hours. Pt endorses taking pain meds every 6 hours. Will see his orthopedist in two weeks, and then will check with his orthopedist about any pain meds. Pt reports has surgeries in the past and was on oxycodone and was able to stop it w/o issue.   - rehab: reports they are doing a whole lot, has no problem with legs, but not getting much with her arm therapy, but endorses OT working on her fingers movements, but not arms.     ===========================================    CODE STATUS/ADVANCE DIRECTIVES DISCUSSION:   CPR/Full code   Patient's living condition: lives alone  ALLERGIES: Penicillins, Atorvastatin, Diatrizoate, Mushroom, and Nifedipine  PAST MEDICAL HISTORY:  has a past medical history of Squamous cell carcinoma. She also has no past medical history of Basal cell carcinoma or Malignant melanoma (H).  PAST SURGICAL HISTORY:    KNEE ARTHROSCOPY          (IA) IN LIGATE FALLOPIAN TUBE          APPENDECTOMY          KNEE REPLACEMENT   Bilateral      (IA) IN REMOVAL GALLBLADDER          thumb surgery   Right      COLONOSCOPY DIAGNOSTIC      Due 10 yrs     vocal cord polypectomy          CVL CORONARY ANGIOGRAM 2008 - 2008   No flow limiting lesion     VAGINAL HYSTERECTOMY    with repairs and suprapubic cath     SHOULDER REPLACEMENT 2011 - 2011   right     CATARACT REMOVAL 12   left Cataract Removal & IOL     SKIN SURGERY 2016 - 2016   MOHS, chest, [facility]     FOOT SURGERY   Bilateral      RIGHT TOTAL HIP ARTHROPLASTY 10/06/2017 Right      *  FAMILY HISTORY:   Heart Disease Father    age 73 MI      Mother    age 80         SOCIAL HISTORY:   reports that she has quit smoking. She has never used smokeless tobacco.    Post Discharge Medication Reconciliation Status: discharge medications reconciled and changed,  per note/orders  Current Outpatient Medications   Medication Sig Dispense Refill     acetaminophen (TYLENOL) 500 MG tablet Take 1,000 mg by mouth 3 times daily       ALPRAZolam (XANAX) 0.25 MG tablet Take 0.5 mg by mouth 2 times daily   0     amLODIPine (NORVASC) 10 MG tablet Take 10 mg by mouth daily       apixaban ANTICOAGULANT (ELIQUIS) 5 MG tablet Take 5 mg by mouth 2 times daily       baclofen (LIORESAL) 10 MG tablet Take 10 mg by mouth every 8 hours as needed for muscle spasms       calcium carbonate (OS-MARY) 1500 (600 Ca) MG tablet Take 600 mg by mouth daily       carboxymethylcellulose PF (REFRESH PLUS) 0.5 % ophthalmic solution Place 1 drop into both eyes daily as needed for dry eyes       carvedilol (COREG) 3.125 MG tablet Take 3.125 mg by mouth 2 times daily (with meals)       clindamycin (CLEOCIN) 150 MG capsule Take 600 mg by mouth once as needed Take 4 capsules (600 mg) 1 hour prior to dental appointment       DULoxetine (CYMBALTA) 60 MG capsule Take 60 mg by mouth daily   1     Lidocaine (LIDOCARE) 4 % Patch Place 1 patch onto the skin every 24 hours To prevent lidocaine toxicity, patient should be patch free for 12 hrs daily.       losartan (COZAAR) 100 MG tablet Take 100 mg by mouth daily       Lutein 20 MG CAPS Take 1 capsule by mouth 2 times daily       melatonin 5 MG tablet Take 10 mg by mouth At Bedtime       mirtazapine (REMERON) 15 MG tablet Take 15 mg by mouth At Bedtime       Multiple Vitamins-Minerals (CENTRUM WOMEN) TABS Take 1 tablet by mouth daily       nitroGLYcerin (NITROSTAT) 0.4 MG sublingual tablet Place 0.4 mg under the tongue every 5 minutes as needed for chest pain For chest pain place 1 tablet under the tongue every 5 minutes for 3 doses. If symptoms persist 5 minutes after 1st dose call 911.       omeprazole (PRILOSEC) 20 MG capsule Take 20 mg by mouth daily before breakfast   1     oxyCODONE (ROXICODONE) 5 MG tablet Take 2.5 mg by mouth every 6 hours as needed for pain        polyethylene glycol 3350 POWD Take 17 g by mouth daily as needed       rosuvastatin (CRESTOR) 40 MG tablet Take 40 mg by mouth At Bedtime       Vitamin D, Cholecalciferol, 25 MCG (1000 UT) TABS Take 1 tablet by mouth daily         ROS:  10 point ROS of systems including Constitutional, Eyes, Respiratory, Cardiovascular, Gastroenterology, Genitourinary, Integumentary, Musculoskeletal, Psychiatric were all negative except for pertinent positives noted in my HPI.    Vitals:  BP (!) 142/82   Pulse 57   Temp 98.1  F (36.7  C)   Resp 18   Ht 1.524 m (5')   Wt 68.1 kg (150 lb 3.2 oz)   SpO2 97%   BMI 29.33 kg/m    Exam:  GENERAL APPEARANCE:  in no distress, cooperative  ENT:  Mouth and posterior oropharynx normal, moist mucous membranes, oral mucosa moist, no lesion noted.   EYES:  EOMI, Pupil rounded and equal.  RESP:  lungs clear to auscultation, no wheezing or crackles.   CV:  S1S2 audible, regular HR, no murmur appreciated.   ABDOMEN:  soft, NT/ND, BS audible. no mass appreciated on palpation.   M/S:  Cast left forearm and wrist, distal NVB intact. Deformed fingers b/l . ROM left shoulder good, slight limitation with internal rotation over anterior glenoid.  SKIN:  No rash.   NEURO:   No NFD appreciated on observation.  PSYCH:  normal insight, judgement and memory, affect and mood normal    Lab/Diagnostic data: Reviewed in the chart and EHR.        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  Had a fall resulted in left 9th rib fx and left distal radius fx which were managed conservatively, complicated with intractable pain, requried another hospitalization for optimal analgesia, and was found to have rib 6 and 7th fx, was found to be a candidate for TCU admission but declined, came back to ER due to inability to care for herself  - Left shoulder acute  pain:   - analgesia optimal with oxycodone q 6 hr. Continued for now. Spoke about reducing dose/ or frequency, pt would like to discuss this with orthopedic team.   - started rehab program, making a progress, continue until desired goal is achieved.   - Ortho team follow up. Will see them in two weeks.   - Respiratory toiletry care  - counseled to keep left arm elevated, or resting on a pillow to relieve pressure of left shoulder. qq      Chronic Atrial fibrillation (H)  CAD:  - Essential HTN  - CVR. On Eliquis and ASA.       Order: See above, otherwise, continue the rest of the current POC.       Electronically signed by:  Kyle Guzman MD

## 2021-09-08 ENCOUNTER — TRANSITIONAL CARE UNIT VISIT (OUTPATIENT)
Dept: GERIATRICS | Facility: CLINIC | Age: 86
End: 2021-09-08
Payer: MEDICARE

## 2021-09-08 DIAGNOSIS — I10 ESSENTIAL HYPERTENSION: ICD-10-CM

## 2021-09-08 DIAGNOSIS — S52.502D CLOSED FRACTURE OF DISTAL END OF LEFT RADIUS WITH ROUTINE HEALING, UNSPECIFIED FRACTURE MORPHOLOGY, SUBSEQUENT ENCOUNTER: Primary | ICD-10-CM

## 2021-09-08 DIAGNOSIS — S22.41XD CLOSED FRACTURE OF MULTIPLE RIBS OF RIGHT SIDE WITH ROUTINE HEALING, SUBSEQUENT ENCOUNTER: ICD-10-CM

## 2021-09-08 DIAGNOSIS — S22.32XD CLOSED FRACTURE OF ONE RIB OF LEFT SIDE WITH ROUTINE HEALING, SUBSEQUENT ENCOUNTER: ICD-10-CM

## 2021-09-08 DIAGNOSIS — Z86.73 HISTORY OF CVA (CEREBROVASCULAR ACCIDENT): ICD-10-CM

## 2021-09-08 DIAGNOSIS — I48.21 PERMANENT ATRIAL FIBRILLATION (H): ICD-10-CM

## 2021-09-08 PROCEDURE — 99305 1ST NF CARE MODERATE MDM 35: CPT | Performed by: FAMILY MEDICINE

## 2021-09-08 NOTE — LETTER
9/8/2021        RE: Nathalie Betts  [address] [facility] [facility] [place] [address]        [facility]  PRIMARY CARE PROVIDER AND CLINIC:  LEX DEMPSEY MD, [facility] [address] / [place] [address]  Chief Complaint   Patient presents with     Hospital F/U     [facility] Medical Record Number:  [id number]  Place of Service where encounter took place:  [facility] ([facility]) [[id number]]    Nathalie Betts  is a 86 year old  (1935), admitted to the above facility from  [facility]. Hospital stay 8/30/21 through 8/30/21..  Admitted to this facility for  rehab, medical management and nursing care.    HPI:    HPI information obtained from: facility chart records, facility staff, patient report and [facility] chart review. Received verbal consent to use Care Everywhere in order to access labs, documents, histories, and all other needed information to provide care at current facility.    Brief Summary of Hospital Course:   - Pt with PMH notable for  A-fib on OAC, osteoporosis and primary hyperparathyroidism.     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     8/24/21-8/26/21:presented with intractable, pain, hospitalized for optimal analgesia. CT showed Rib 6 and 7 fx. Patient declined TCU admission, hence dismissed home.     8/30/21: presented to ED with inability to care for herself.       Today:  - Rib fx: pt reports it is sore, and very painful with touch, getting better, has to sleep on her back and puts her arm on the pillows. Reports breathing is better now, and she is getting used to her ribs.   - L radius fx: reports was getting therapy on left arm from her stroke, now this am  has a pain in her left shoulder, but unsure if this because of her left forearm handing down with cast, also added had rotator cuff injury in the past.   - RN reports that patient religously has been asking for oxycodone every 6 hours. Pt endorses taking pain meds every 6 hours. Will see his orthopedist in two weeks, and then will check with his orthopedist about any pain meds. Pt reports has surgeries in the past and was on oxycodone and was able to stop it w/o issue.   - rehab: reports they are doing a whole lot, has no problem with legs, but not getting much with her arm therapy, but endorses OT working on her fingers movements, but not arms.     ===========================================    CODE STATUS/ADVANCE DIRECTIVES DISCUSSION:   CPR/Full code   Patient's living condition: lives alone  ALLERGIES: Penicillins, Atorvastatin, Diatrizoate, Mushroom, and Nifedipine  PAST MEDICAL HISTORY:  has a past medical history of Squamous cell carcinoma. She also has no past medical history of Basal cell carcinoma or Malignant melanoma (H).  PAST SURGICAL HISTORY:    KNEE ARTHROSCOPY          (IA) TX LIGATE FALLOPIAN TUBE          APPENDECTOMY          KNEE REPLACEMENT   Bilateral      (IA) TX REMOVAL GALLBLADDER          thumb surgery   Right      COLONOSCOPY DIAGNOSTIC      Due 10 yrs     vocal cord polypectomy          CVL CORONARY ANGIOGRAM 2008 - 2008   No flow limiting lesion     VAGINAL HYSTERECTOMY    with repairs and suprapubic cath     SHOULDER REPLACEMENT 2011 - 2011   right     CATARACT REMOVAL 12   left Cataract Removal & IOL     SKIN SURGERY 2016 - 2016   MOHS, chest, [facility]     FOOT SURGERY   Bilateral      RIGHT TOTAL HIP ARTHROPLASTY 10/06/2017 Right      *  FAMILY HISTORY:   Heart Disease Father    age 73 MI      Mother    age 80         SOCIAL HISTORY:   reports that she has quit smoking. She has never used smokeless  tobacco.    Post Discharge Medication Reconciliation Status: discharge medications reconciled and changed, per note/orders  Current Outpatient Medications   Medication Sig Dispense Refill     acetaminophen (TYLENOL) 500 MG tablet Take 1,000 mg by mouth 3 times daily       ALPRAZolam (XANAX) 0.25 MG tablet Take 0.5 mg by mouth 2 times daily   0     amLODIPine (NORVASC) 10 MG tablet Take 10 mg by mouth daily       apixaban ANTICOAGULANT (ELIQUIS) 5 MG tablet Take 5 mg by mouth 2 times daily       baclofen (LIORESAL) 10 MG tablet Take 10 mg by mouth every 8 hours as needed for muscle spasms       calcium carbonate (OS-MARY) 1500 (600 Ca) MG tablet Take 600 mg by mouth daily       carboxymethylcellulose PF (REFRESH PLUS) 0.5 % ophthalmic solution Place 1 drop into both eyes daily as needed for dry eyes       carvedilol (COREG) 3.125 MG tablet Take 3.125 mg by mouth 2 times daily (with meals)       clindamycin (CLEOCIN) 150 MG capsule Take 600 mg by mouth once as needed Take 4 capsules (600 mg) 1 hour prior to dental appointment       DULoxetine (CYMBALTA) 60 MG capsule Take 60 mg by mouth daily   1     Lidocaine (LIDOCARE) 4 % Patch Place 1 patch onto the skin every 24 hours To prevent lidocaine toxicity, patient should be patch free for 12 hrs daily.       losartan (COZAAR) 100 MG tablet Take 100 mg by mouth daily       Lutein 20 MG CAPS Take 1 capsule by mouth 2 times daily       melatonin 5 MG tablet Take 10 mg by mouth At Bedtime       mirtazapine (REMERON) 15 MG tablet Take 15 mg by mouth At Bedtime       Multiple Vitamins-Minerals (CENTRUM WOMEN) TABS Take 1 tablet by mouth daily       nitroGLYcerin (NITROSTAT) 0.4 MG sublingual tablet Place 0.4 mg under the tongue every 5 minutes as needed for chest pain For chest pain place 1 tablet under the tongue every 5 minutes for 3 doses. If symptoms persist 5 minutes after 1st dose call 911.       omeprazole (PRILOSEC) 20 MG capsule Take 20 mg by mouth daily before  breakfast   1     oxyCODONE (ROXICODONE) 5 MG tablet Take 2.5 mg by mouth every 6 hours as needed for pain       polyethylene glycol 3350 POWD Take 17 g by mouth daily as needed       rosuvastatin (CRESTOR) 40 MG tablet Take 40 mg by mouth At Bedtime       Vitamin D, Cholecalciferol, 25 MCG (1000 UT) TABS Take 1 tablet by mouth daily         ROS:  10 point ROS of systems including Constitutional, Eyes, Respiratory, Cardiovascular, Gastroenterology, Genitourinary, Integumentary, Musculoskeletal, Psychiatric were all negative except for pertinent positives noted in my HPI.    Vitals:  BP (!) 142/82   Pulse 57   Temp 98.1  F (36.7  C)   Resp 18   Ht 1.524 m (5')   Wt 68.1 kg (150 lb 3.2 oz)   SpO2 97%   BMI 29.33 kg/m    Exam:  GENERAL APPEARANCE:  in no distress, cooperative  ENT:  Mouth and posterior oropharynx normal, moist mucous membranes, oral mucosa moist, no lesion noted.   EYES:  EOMI, Pupil rounded and equal.  RESP:  lungs clear to auscultation, no wheezing or crackles.   CV:  S1S2 audible, regular HR, no murmur appreciated.   ABDOMEN:  soft, NT/ND, BS audible. no mass appreciated on palpation.   M/S:  Cast left forearm and wrist, distal NVB intact. Deformed fingers b/l . ROM left shoulder good, slight limitation with internal rotation over anterior glenoid.  SKIN:  No rash.   NEURO:   No NFD appreciated on observation.  PSYCH:  normal insight, judgement and memory, affect and mood normal    Lab/Diagnostic data: Reviewed in the chart and EHR.        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  Had a fall resulted in left 9th rib fx and left distal radius fx which were managed conservatively, complicated with intractable pain, requried another hospitalization for optimal analgesia, and was found to have rib 6 and 7th fx, was found to be a  candidate for TCU admission but declined, came back to ER due to inability to care for herself  - Left shoulder acute pain:   - analgesia optimal with oxycodone q 6 hr. Continued for now. Spoke about reducing dose/ or frequency, pt would like to discuss this with orthopedic team.   - started rehab program, making a progress, continue until desired goal is achieved.   - Ortho team follow up. Will see them in two weeks.   - Respiratory toiletry care  - counseled to keep left arm elevated, or resting on a pillow to relieve pressure of left shoulder. qq      Chronic Atrial fibrillation (H)  CAD:  - Essential HTN  - CVR. On Eliquis and ASA.       Order: See above, otherwise, continue the rest of the current POC.       Electronically signed by:  Kyle Guzman MD                   Sincerely,        Kyle Guzman MD

## 2021-09-09 ENCOUNTER — TRANSITIONAL CARE UNIT VISIT (OUTPATIENT)
Dept: GERIATRICS | Facility: CLINIC | Age: 86
End: 2021-09-09
Payer: MEDICARE

## 2021-09-09 VITALS
OXYGEN SATURATION: 97 % | WEIGHT: 150.2 LBS | HEIGHT: 60 IN | TEMPERATURE: 97.3 F | BODY MASS INDEX: 29.49 KG/M2 | SYSTOLIC BLOOD PRESSURE: 109 MMHG | RESPIRATION RATE: 18 BRPM | DIASTOLIC BLOOD PRESSURE: 68 MMHG | HEART RATE: 57 BPM

## 2021-09-09 DIAGNOSIS — F32.A ANXIETY AND DEPRESSION: ICD-10-CM

## 2021-09-09 DIAGNOSIS — S52.502D CLOSED FRACTURE OF DISTAL END OF LEFT RADIUS WITH ROUTINE HEALING, UNSPECIFIED FRACTURE MORPHOLOGY, SUBSEQUENT ENCOUNTER: Primary | ICD-10-CM

## 2021-09-09 DIAGNOSIS — S22.32XD CLOSED FRACTURE OF ONE RIB OF LEFT SIDE WITH ROUTINE HEALING, SUBSEQUENT ENCOUNTER: ICD-10-CM

## 2021-09-09 DIAGNOSIS — F41.9 ANXIETY AND DEPRESSION: ICD-10-CM

## 2021-09-09 PROCEDURE — 99308 SBSQ NF CARE LOW MDM 20: CPT | Performed by: NURSE PRACTITIONER

## 2021-09-09 ASSESSMENT — MIFFLIN-ST. JEOR: SCORE: 1047.8

## 2021-09-09 NOTE — PROGRESS NOTES
"[facility] SERVICES  [place] Medical Record Number: [id number]  Chief Complaint   Patient presents with     RECHECK     HPI: Nathalie Betts is a 86 year old (1935), who is being seen today for an episodic care visit at: [facility] ([facility]) [[id number]].     Per RN patient with significant anxiety. Patient demanding with staff and angry if pain medications are late. RN reports patient is \"clock watching\". No signs of pain per RN or therapy staff, more anxiety.     Patient working with physical therapy and OT. Will likely discharge in 1-2 weeks.     Allergies and PMH/PSH reviewed in [facility] today.    REVIEW OF SYSTEMS:  4 point ROS including Respiratory, CV, GI and , other than that noted in the HPI,  is negative    Objective:   /68   Pulse 57   Temp 97.3  F (36.3  C)   Resp 18   Ht 1.524 m (5')   Wt 68.1 kg (150 lb 3.2 oz)   SpO2 97%   BMI 29.33 kg/m    Exam:  GENERAL APPEARANCE:  Alert, in no distress    Labs:   Recent labs in [facility] reviewed by me today.     Assessment/Plan:     Closed fracture of distal end of left radius with routine healing, unspecified fracture morphology, subsequent encounter  Closed fracture of one rib of left side with routine healing, subsequent encounter  Anxiety and depression   - oxycodone refilled today for #20.   - If patient continues to use 4/day, will plan to schedule tylenol  - consider adding gabapentin for anxiety/pain  - staff to update with concerns      Electronically signed by: BETHANY Armas CNP  "

## 2021-09-09 NOTE — LETTER
"    9/9/2021        RE: Nathalie Betts  [address] [address]        [facility] Medical Record Number: [id number]  Chief Complaint   Patient presents with     RECHECK     HPI: Nathalie Betts is a 86 year old (1935), who is being seen today for an episodic care visit at: [facility] ([facility]) [[id number]].     Per RN patient with significant anxiety. Patient demanding with staff and angry if pain medications are late. RN reports patient is \"clock watching\". No signs of pain per RN or therapy staff, more anxiety.     Patient working with physical therapy and OT. Will likely discharge in 1-2 weeks.     Allergies and PMH/PSH reviewed in [facility] today.    REVIEW OF SYSTEMS:  4 point ROS including Respiratory, CV, GI and , other than that noted in the HPI,  is negative    Objective:   /68   Pulse 57   Temp 97.3  F (36.3  C)   Resp 18   Ht 1.524 m (5')   Wt 68.1 kg (150 lb 3.2 oz)   SpO2 97%   BMI 29.33 kg/m    Exam:  GENERAL APPEARANCE:  Alert, in no distress    Labs:   Recent labs in [facility] reviewed by me today.     Assessment/Plan:     Closed fracture of distal end of left radius with routine healing, unspecified fracture morphology, subsequent encounter  Closed fracture of one rib of left side with routine healing, subsequent encounter  Anxiety and depression   - oxycodone refilled today for #20.   - If patient continues to use 4/day, will plan to schedule tylenol  - consider adding gabapentin for anxiety/pain  - staff to update with concerns      Electronically signed by: BETHANY Armas CNP        Sincerely,        BETHANY Armas CNP    "

## 2021-09-10 RX ORDER — OXYCODONE HYDROCHLORIDE 5 MG/1
5 TABLET ORAL EVERY 6 HOURS PRN
Qty: 20 TABLET | Refills: 0 | Status: SHIPPED | OUTPATIENT
Start: 2021-09-10 | End: 2021-09-20

## 2021-09-13 ENCOUNTER — TRANSITIONAL CARE UNIT VISIT (OUTPATIENT)
Dept: GERIATRICS | Facility: CLINIC | Age: 86
End: 2021-09-13
Payer: MEDICARE

## 2021-09-13 VITALS
WEIGHT: 156.8 LBS | SYSTOLIC BLOOD PRESSURE: 126 MMHG | BODY MASS INDEX: 30.78 KG/M2 | OXYGEN SATURATION: 96 % | HEART RATE: 62 BPM | HEIGHT: 60 IN | TEMPERATURE: 96.9 F | DIASTOLIC BLOOD PRESSURE: 85 MMHG | RESPIRATION RATE: 18 BRPM

## 2021-09-13 DIAGNOSIS — S52.502D CLOSED FRACTURE OF DISTAL END OF LEFT RADIUS WITH ROUTINE HEALING, UNSPECIFIED FRACTURE MORPHOLOGY, SUBSEQUENT ENCOUNTER: Primary | ICD-10-CM

## 2021-09-13 DIAGNOSIS — F41.9 ANXIETY AND DEPRESSION: ICD-10-CM

## 2021-09-13 DIAGNOSIS — F32.A ANXIETY AND DEPRESSION: ICD-10-CM

## 2021-09-13 DIAGNOSIS — S22.32XD CLOSED FRACTURE OF ONE RIB OF LEFT SIDE WITH ROUTINE HEALING, SUBSEQUENT ENCOUNTER: ICD-10-CM

## 2021-09-13 PROCEDURE — 99309 SBSQ NF CARE MODERATE MDM 30: CPT | Performed by: NURSE PRACTITIONER

## 2021-09-13 ASSESSMENT — MIFFLIN-ST. JEOR: SCORE: 1077.74

## 2021-09-13 NOTE — LETTER
9/13/2021        RE: Nathalie Betts  [address] [address]        [facility] Medical Record Number: [id number]  Chief Complaint   Patient presents with     RECHECK     anxiety f/u, pain f/u     HPI: Nathalie Betts is a 86 year old (1935), who is being seen today for an episodic care visit at: [facility] ([facility]) [[id number]].    RN reports patient with ongoing request for oxycodone q6 hrs despite to signs of pain. RN notes patient very anxious and controlling at times.     Met with patient today after physical therapy. She exhibits no signs of pain or distress. She is very conversant. States difficulty getting comfortable at noc due to fx's. Difficultly taking a deep breath due to rib pain. Denies cough or CP. No fever or chills.      Allergies and PMH/PSH reviewed in [facility] today.    REVIEW OF SYSTEMS:  4 point ROS including Respiratory, CV, GI and , other than that noted in the HPI,  is negative    Objective:   /85   Pulse 62   Temp 96.9  F (36.1  C)   Resp 18   Ht 1.524 m (5')   Wt 71.1 kg (156 lb 12.8 oz)   SpO2 96%   BMI 30.62 kg/m    Exam:  GENERAL APPEARANCE:  Alert, in no distress  RESP:  lungs clear to auscultation , no respiratory distress  CV:  regular rate and rhythm, no murmur, rub, or gallop, no edema  ABDOMEN:  normal bowel sounds, soft, nontender, no hepatosplenomegaly or other masses  SKIN:  Inspection of skin and subcutaneous tissue baseline  PSYCH:  oriented X 3, affect and mood normal, anxious    Labs:   Recent labs in [facility] reviewed by me today.     Assessment/Plan:     Closed fracture of distal end of left radius with routine healing, unspecified fracture morphology, subsequent encounter  Closed fracture of one rib of left side with routine healing, subsequent encounter  Anxiety and depression   - discussed limiting oxycodone if pain controlled. Continue scheduled tylenol  - will start  gabapentin at  (patient has previously tolerated this well) for pain, sleep and anxiety. Consider increasing dose if pain and anxiety persisit    Electronically signed by: BETHANY Armas CNP        Sincerely,        BETHANY Armas CNP

## 2021-09-13 NOTE — PROGRESS NOTES
[facility] Medical Record Number: [id number]  Chief Complaint   Patient presents with     RECHECK     anxiety f/u, pain f/u     HPI: Nathalie Betts is a 86 year old (1935), who is being seen today for an episodic care visit at: [facility] ([facility]) [[id number]].    RN reports patient with ongoing request for oxycodone q6 hrs despite to signs of pain. RN notes patient very anxious and controlling at times.     Met with patient today after physical therapy. She exhibits no signs of pain or distress. She is very conversant. States difficulty getting comfortable at noc due to fx's. Difficultly taking a deep breath due to rib pain. Denies cough or CP. No fever or chills.      Allergies and PMH/PSH reviewed in [facility] today.    REVIEW OF SYSTEMS:  4 point ROS including Respiratory, CV, GI and , other than that noted in the HPI,  is negative    Objective:   /85   Pulse 62   Temp 96.9  F (36.1  C)   Resp 18   Ht 1.524 m (5')   Wt 71.1 kg (156 lb 12.8 oz)   SpO2 96%   BMI 30.62 kg/m    Exam:  GENERAL APPEARANCE:  Alert, in no distress  RESP:  lungs clear to auscultation , no respiratory distress  CV:  regular rate and rhythm, no murmur, rub, or gallop, no edema  ABDOMEN:  normal bowel sounds, soft, nontender, no hepatosplenomegaly or other masses  SKIN:  Inspection of skin and subcutaneous tissue baseline  PSYCH:  oriented X 3, affect and mood normal, anxious    Labs:   Recent labs in [facility] reviewed by me today.     Assessment/Plan:     Closed fracture of distal end of left radius with routine healing, unspecified fracture morphology, subsequent encounter  Closed fracture of one rib of left side with routine healing, subsequent encounter  Anxiety and depression   - discussed limiting oxycodone if pain controlled. Continue scheduled tylenol  - will start gabapentin at [facility] (patient has previously tolerated this well) for pain, sleep and anxiety. Consider  increasing dose if pain and anxiety persisit    Electronically signed by: BETHANY Armas CNP

## 2021-09-16 ENCOUNTER — TRANSITIONAL CARE UNIT VISIT (OUTPATIENT)
Dept: GERIATRICS | Facility: CLINIC | Age: 86
End: 2021-09-16
Payer: MEDICARE

## 2021-09-16 VITALS
HEART RATE: 71 BPM | SYSTOLIC BLOOD PRESSURE: 122 MMHG | WEIGHT: 156.8 LBS | DIASTOLIC BLOOD PRESSURE: 76 MMHG | BODY MASS INDEX: 30.78 KG/M2 | TEMPERATURE: 96.5 F | OXYGEN SATURATION: 98 % | HEIGHT: 60 IN | RESPIRATION RATE: 16 BRPM

## 2021-09-16 DIAGNOSIS — S52.502D CLOSED FRACTURE OF DISTAL END OF LEFT RADIUS WITH ROUTINE HEALING, UNSPECIFIED FRACTURE MORPHOLOGY, SUBSEQUENT ENCOUNTER: Primary | ICD-10-CM

## 2021-09-16 DIAGNOSIS — F41.9 ANXIETY AND DEPRESSION: ICD-10-CM

## 2021-09-16 DIAGNOSIS — S22.41XD CLOSED FRACTURE OF MULTIPLE RIBS OF RIGHT SIDE WITH ROUTINE HEALING, SUBSEQUENT ENCOUNTER: ICD-10-CM

## 2021-09-16 DIAGNOSIS — F32.A ANXIETY AND DEPRESSION: ICD-10-CM

## 2021-09-16 PROCEDURE — 99309 SBSQ NF CARE MODERATE MDM 30: CPT | Performed by: NURSE PRACTITIONER

## 2021-09-16 ASSESSMENT — MIFFLIN-ST. JEOR: SCORE: 1077.74

## 2021-09-16 NOTE — PROGRESS NOTES
[facility]ERIATRIC SERVICES    Chief Complaint   Patient presents with     Recheck Medication     HPI:  Nathalie Betts is a 86 year old  (1935), who is being seen today for an episodic care visit at: [facility] ([facility]) [[id number]].     Patient had wrist re-casted this week at ortho visit. Pain controlled, patient switched from oxycodone to tramadol. Patient is ok with discontinuing oxycodone. Working with physical therapy, hopes to return home in 1 week.     Allergies, and PMH/PSH reviewed in [facility] today.  REVIEW OF SYSTEMS:  4 point ROS including Respiratory, CV, GI and , other than that noted in the HPI,  is negative    Objective:   /76   Pulse 71   Temp (!) 96.5  F (35.8  C)   Resp 16   Ht 1.524 m (5')   Wt 71.1 kg (156 lb 12.8 oz)   SpO2 98%   BMI 30.62 kg/m    GENERAL APPEARANCE:  Alert, in no distress  RESP:  lungs clear to auscultation , no respiratory distress  CV:  regular rate and rhythm, no murmur, rub, or gallop, no edema  ABDOMEN:  normal bowel sounds, soft, nontender, no hepatosplenomegaly or other masses  SKIN:  left fingers with good cms  PSYCH:  oriented X 3, affect and mood normal    Recent labs in [facility] reviewed by me today.     Assessment/Plan:     Closed fracture of distal end of left radius with routine healing, unspecified fracture morphology, subsequent encounter  Closed fracture of multiple ribs of right side with routine healing, subsequent encounter  Anxiety and depression   - doing well in therapy  - pain controlled, will stop oxycodone when tramadol arrives  - RN to update with concerns      Electronically signed by: BETHANY Armas CNP

## 2021-09-16 NOTE — LETTER
9/16/2021        RE: Nathalie Betts  [address] [address]         [facility]    Chief Complaint   Patient presents with     Recheck Medication     HPI:  Nathalie Betts is a 86 year old  (1935), who is being seen today for an episodic care visit at: [facility] ([facility]) [[id number]].     Patient had wrist re-casted this week at ortho visit. Pain controlled, patient switched from oxycodone to tramadol. Patient is ok with discontinuing oxycodone. Working with physical therapy, hopes to return home in 1 week.     Allergies, and PMH/PSH reviewed in [facility] today.  REVIEW OF SYSTEMS:  4 point ROS including Respiratory, CV, GI and , other than that noted in the HPI,  is negative    Objective:   /76   Pulse 71   Temp (!) 96.5  F (35.8  C)   Resp 16   Ht 1.524 m (5')   Wt 71.1 kg (156 lb 12.8 oz)   SpO2 98%   BMI 30.62 kg/m    GENERAL APPEARANCE:  Alert, in no distress  RESP:  lungs clear to auscultation , no respiratory distress  CV:  regular rate and rhythm, no murmur, rub, or gallop, no edema  ABDOMEN:  normal bowel sounds, soft, nontender, no hepatosplenomegaly or other masses  SKIN:  left fingers with good cms  PSYCH:  oriented X 3, affect and mood normal    Recent labs in [facility] reviewed by me today.     Assessment/Plan:     Closed fracture of distal end of left radius with routine healing, unspecified fracture morphology, subsequent encounter  Closed fracture of multiple ribs of right side with routine healing, subsequent encounter  Anxiety and depression   - doing well in therapy  - pain controlled, will stop oxycodone when tramadol arrives  - RN to update with concerns      Electronically signed by: BETHANY Armas CNP             Sincerely,        BETHANY Armas CNP

## 2021-09-20 RX ORDER — TRAMADOL HYDROCHLORIDE 50 MG/1
50 TABLET ORAL EVERY 6 HOURS PRN
COMMUNITY
End: 2021-09-23

## 2021-09-23 ENCOUNTER — DISCHARGE SUMMARY NURSING HOME (OUTPATIENT)
Dept: GERIATRICS | Facility: CLINIC | Age: 86
End: 2021-09-23
Payer: MEDICARE

## 2021-09-23 VITALS
TEMPERATURE: 97.3 F | WEIGHT: 152.1 LBS | HEIGHT: 60 IN | OXYGEN SATURATION: 97 % | HEART RATE: 54 BPM | DIASTOLIC BLOOD PRESSURE: 79 MMHG | SYSTOLIC BLOOD PRESSURE: 141 MMHG | RESPIRATION RATE: 18 BRPM | BODY MASS INDEX: 29.86 KG/M2

## 2021-09-23 DIAGNOSIS — S22.41XD CLOSED FRACTURE OF MULTIPLE RIBS OF RIGHT SIDE WITH ROUTINE HEALING, SUBSEQUENT ENCOUNTER: ICD-10-CM

## 2021-09-23 DIAGNOSIS — S52.502D CLOSED FRACTURE OF DISTAL END OF LEFT RADIUS WITH ROUTINE HEALING, UNSPECIFIED FRACTURE MORPHOLOGY, SUBSEQUENT ENCOUNTER: Primary | ICD-10-CM

## 2021-09-23 PROCEDURE — 99316 NF DSCHRG MGMT 30 MIN+: CPT | Performed by: NURSE PRACTITIONER

## 2021-09-23 RX ORDER — TRAMADOL HYDROCHLORIDE 50 MG/1
50 TABLET ORAL EVERY 6 HOURS PRN
Qty: 15 TABLET | Refills: 0 | Status: SHIPPED | OUTPATIENT
Start: 2021-09-23

## 2021-09-23 ASSESSMENT — MIFFLIN-ST. JEOR: SCORE: 1056.42

## 2021-09-23 NOTE — PROGRESS NOTES
[facility] GERIATRIC SERVICES DISCHARGE SUMMARY  Patient Name: Nathalie Betts  YOB: 1935  [place] Medical Record Number: [id number]  Place of Service Where Encounter Took Place: THE [facility] ([facility]) [[id number]]    PRIMARY CARE PROVIDER AND CLINIC RESPONSIBLE AFTER TRANSFER: LEX DEMPSEY MD, [employer] [address] / [place] [address]; Phone: [phone]; Non-G Provider     Transferring providers: BETHANY Hung CNP; Kyel Guzman MD  Recent Hospitalization/ED: [facility] stay 8/24/21 to 8/26/21.  Date of SNF Admission: 8/26/2021  Date of SNF (anticipated) Discharge: 9/24/2021  Discharged to: previous independent home  Cognitive Scores: SLUMS: 28/30    Patient with a fall at home on 8/25/21 and sustained left sided rib fractures. TCU was recommended patient patient declined. Patient returned to the ER due to severe pain and inability to care for herself.      Patient had a CVA May '21. Has completed in patient and home therapy.    CODE STATUS/ADVANCE DIRECTIVES DISCUSSION: No Order - CPR/Full Code  ALLERGIES: Penicillins, Atorvastatin, Diatrizoate, Mushroom, and Nifedipine    NURSING FACILITY COURSE   Closed fracture of distal end of left radius with routine healing, unspecified fracture morphology, subsequent encounter  Closed fracture of one rib of left side with routine healing, subsequent encounter  - following with ortho  - pain controlled, will send home with tramadol   - patient worked with physical therapy/OT     Anxiety and depression  - chronic. Continue xanax, Cymbalta and remeron  - significant anxiety during Tcu stay     Gastroesophageal reflux disease with esophagitis without hemorrhage  - sx's stable, continue PPI     History of CVA (cerebrovascular accident)  - following with neurology  - treated with TPA initially, then dual antiplatelet therapy. Afib noted in June (now in SR), patient to continue eliquis and asa  per neurology     Stable angina (H)  Essential hypertension  - has used ntg 1-2 times in the last year  -  Continue coreg and norvasc  - No CP in TCU     Slow transit constipation  - due to narcotics, patient has miralax available, she does not want to have bowel meds scheduled at this time      Discharge Medications:  Current Outpatient Medications   Medication Sig Dispense Refill     traMADol (ULTRAM) 50 MG tablet Take 1 tablet (50 mg) by mouth every 6 hours as needed for severe pain 15 tablet 0     acetaminophen (TYLENOL) 500 MG tablet Take 1,000 mg by mouth 3 times daily       ALPRAZolam (XANAX) 0.25 MG tablet Take 0.5 mg by mouth 2 times daily   0     amLODIPine (NORVASC) 10 MG tablet Take 10 mg by mouth daily       apixaban ANTICOAGULANT (ELIQUIS) 5 MG tablet Take 5 mg by mouth 2 times daily       baclofen (LIORESAL) 10 MG tablet Take 10 mg by mouth every 8 hours as needed for muscle spasms       calcium carbonate (OS-MARY) 1500 (600 Ca) MG tablet Take 600 mg by mouth daily       carboxymethylcellulose PF (REFRESH PLUS) 0.5 % ophthalmic solution Place 1 drop into both eyes daily as needed for dry eyes       carvedilol (COREG) 3.125 MG tablet Take 3.125 mg by mouth 2 times daily (with meals)       clindamycin (CLEOCIN) 150 MG capsule Take 600 mg by mouth once as needed Take 4 capsules (600 mg) 1 hour prior to dental appointment       DULoxetine (CYMBALTA) 60 MG capsule Take 60 mg by mouth daily   1     Lidocaine (LIDOCARE) 4 % Patch Place 1 patch onto the skin every 24 hours To prevent lidocaine toxicity, patient should be patch free for 12 hrs daily.       losartan (COZAAR) 100 MG tablet Take 100 mg by mouth daily       Lutein 20 MG CAPS Take 1 capsule by mouth 2 times daily       melatonin 5 MG tablet Take 10 mg by mouth At Bedtime       mirtazapine (REMERON) 15 MG tablet Take 15 mg by mouth At Bedtime       Multiple Vitamins-Minerals (CENTRUM WOMEN) TABS Take 1 tablet by mouth daily       nitroGLYcerin  (NITROSTAT) 0.4 MG sublingual tablet Place 0.4 mg under the tongue every 5 minutes as needed for chest pain For chest pain place 1 tablet under the tongue every 5 minutes for 3 doses. If symptoms persist 5 minutes after 1st dose call 911.       omeprazole (PRILOSEC) 20 MG capsule Take 20 mg by mouth daily before breakfast   1     polyethylene glycol 3350 POWD Take 17 g by mouth daily as needed       rosuvastatin (CRESTOR) 40 MG tablet Take 40 mg by mouth At Bedtime       Vitamin D, Cholecalciferol, 25 MCG (1000 UT) TABS Take 1 tablet by mouth daily       Controlled medications:   Medication: tramadol , 10 tabs given to patient at the time of discharge to take home     Past Medical History:   Past Medical History:   Diagnosis Date     Squamous cell carcinoma      Physical Exam:   Vitals: BP (!) 141/79   Pulse 54   Temp 97.3  F (36.3  C)   Resp 18   Ht 1.524 m (5')   Wt 69 kg (152 lb 1.6 oz)   SpO2 97%   BMI 29.70 kg/m    BMI= Body mass index is 29.7 kg/m .  GENERAL APPEARANCE:  Alert, in no distress  RESP:  lungs clear to auscultation , no respiratory distress  CV:  regular rate and rhythm, no murmur, rub, or gallop, no edema  ABDOMEN:  normal bowel sounds, soft, nontender, no hepatosplenomegaly or other masses  SKIN:  Inspection of skin and subcutaneous tissue baseline, good cms left fingers  PSYCH:  oriented X 3, anxious     DISCHARGE PLAN:    Follow up labs: Per PCP    Medical Follow Up:     Follow up with primary care provider in 3-7 days  [facility] scheduled appointments: None.    Discharge Services: Home Care: Occupational Therapy, Physical Therapy, Registered Nurse, Home Health Aide, and . Outpatient: Occupational Therapy and Physical Therapy. Services to be private pay.    Discharge Instructions Verbalized to Patient at Discharge:     DO NOT DRIVE while taking narcotic pain medications.     TOTAL DISCHARGE TIME: Greater than 30 minutes  Electronically signed by:  Adry Herman  APRN CNP     Documentation of Face to Face and Certification for Home Health Services    I certify that services are/were furnished while this patient was under the care of a physician and that a physician or an allowed non-physician practitioner (NPP), had a face-to-face encounter that meets the physician face-to-face encounter requirements. The encounter was in whole, or in part, related to the primary reason for home health. The patient is confined to his/her home and needs intermittent skilled nursing, physical therapy, speech-language pathology, or the continued need for occupational therapy. A plan of care has been established by a physician and is periodically reviewed by a physician.  Date of Face-to-Face Encounter: 9/23/21.    I certify that, based on my findings, the following services are medically necessary home health services: Nursing, Occupational Therapy, Physical Therapy and Social Work.    My clinical findings support the need for the above skilled services because: Leaving home is medically contraindicated for the following reason(s): Infection risk / immunocompromised state where it is safer for them to receive services in the home...    Patient to re-establish plan of care with their PCP within 7-10 days after leaving the facility to reestablish care.  Medicare certified PECOS provider: BETHANY Armas CNP  Date: September 23, 2021

## 2021-09-23 NOTE — LETTER
9/23/2021        RE: Nathalie Betts  [address] [place] [address]        [facility] GERIATRIC SERVICES DISCHARGE SUMMARY  Patient Name: Nathalie Betts  YOB: 1935  [place] Medical Record Number: [id number]  Place of Service Where Encounter Took Place: THE [facility] AT [facility] ([facility]) [[id number]]    PRIMARY CARE PROVIDER AND CLINIC RESPONSIBLE AFTER TRANSFER: LEX DEMPSEY MD, [facility] [address] / [place] [address]; Phone: [phone]; Non-FMG Provider     Transferring providers: BETHANY Hung CNP; Kyle Guzman MD  Recent Hospitalization/ED: [facility] stay 8/24/21 to 8/26/21.  Date of SNF Admission: 8/26/2021  Date of SNF (anticipated) Discharge: 9/24/2021  Discharged to: previous independent home  Cognitive Scores: SLUMS: 28/30    Patient with a fall at home on 8/25/21 and sustained left sided rib fractures. TCU was recommended patient patient declined. Patient returned to the ER due to severe pain and inability to care for herself.      Patient had a CVA May '21. Has completed in patient and home therapy.    CODE STATUS/ADVANCE DIRECTIVES DISCUSSION: No Order - CPR/Full Code  ALLERGIES: Penicillins, Atorvastatin, Diatrizoate, Mushroom, and Nifedipine    NURSING FACILITY COURSE   Closed fracture of distal end of left radius with routine healing, unspecified fracture morphology, subsequent encounter  Closed fracture of one rib of left side with routine healing, subsequent encounter  - following with ortho  - pain controlled, will send home with tramadol   - patient worked with physical therapy/OT     Anxiety and depression  - chronic. Continue xanax, Cymbalta and remeron  - significant anxiety during Tcu stay     Gastroesophageal reflux disease with esophagitis without hemorrhage  - sx's stable, continue PPI     History of CVA (cerebrovascular accident)  - following with neurology  - treated with TPA initially, then  dual antiplatelet therapy. Afib noted in June (now in SR), patient to continue eliquis and asa per neurology     Stable angina (H)  Essential hypertension  - has used ntg 1-2 times in the last year  -  Continue coreg and norvasc  - No CP in TCU     Slow transit constipation  - due to narcotics, patient has miralax available, she does not want to have bowel meds scheduled at this time      Discharge Medications:  Current Outpatient Medications   Medication Sig Dispense Refill     traMADol (ULTRAM) 50 MG tablet Take 1 tablet (50 mg) by mouth every 6 hours as needed for severe pain 15 tablet 0     acetaminophen (TYLENOL) 500 MG tablet Take 1,000 mg by mouth 3 times daily       ALPRAZolam (XANAX) 0.25 MG tablet Take 0.5 mg by mouth 2 times daily   0     amLODIPine (NORVASC) 10 MG tablet Take 10 mg by mouth daily       apixaban ANTICOAGULANT (ELIQUIS) 5 MG tablet Take 5 mg by mouth 2 times daily       baclofen (LIORESAL) 10 MG tablet Take 10 mg by mouth every 8 hours as needed for muscle spasms       calcium carbonate (OS-MARY) 1500 (600 Ca) MG tablet Take 600 mg by mouth daily       carboxymethylcellulose PF (REFRESH PLUS) 0.5 % ophthalmic solution Place 1 drop into both eyes daily as needed for dry eyes       carvedilol (COREG) 3.125 MG tablet Take 3.125 mg by mouth 2 times daily (with meals)       clindamycin (CLEOCIN) 150 MG capsule Take 600 mg by mouth once as needed Take 4 capsules (600 mg) 1 hour prior to dental appointment       DULoxetine (CYMBALTA) 60 MG capsule Take 60 mg by mouth daily   1     Lidocaine (LIDOCARE) 4 % Patch Place 1 patch onto the skin every 24 hours To prevent lidocaine toxicity, patient should be patch free for 12 hrs daily.       losartan (COZAAR) 100 MG tablet Take 100 mg by mouth daily       Lutein 20 MG CAPS Take 1 capsule by mouth 2 times daily       melatonin 5 MG tablet Take 10 mg by mouth At Bedtime       mirtazapine (REMERON) 15 MG tablet Take 15 mg by mouth At Bedtime       Multiple  Vitamins-Minerals (CENTRUM WOMEN) TABS Take 1 tablet by mouth daily       nitroGLYcerin (NITROSTAT) 0.4 MG sublingual tablet Place 0.4 mg under the tongue every 5 minutes as needed for chest pain For chest pain place 1 tablet under the tongue every 5 minutes for 3 doses. If symptoms persist 5 minutes after 1st dose call 911.       omeprazole (PRILOSEC) 20 MG capsule Take 20 mg by mouth daily before breakfast   1     polyethylene glycol 3350 POWD Take 17 g by mouth daily as needed       rosuvastatin (CRESTOR) 40 MG tablet Take 40 mg by mouth At Bedtime       Vitamin D, Cholecalciferol, 25 MCG (1000 UT) TABS Take 1 tablet by mouth daily       Controlled medications:   Medication: tramadol , 10 tabs given to patient at the time of discharge to take home     Past Medical History:   Past Medical History:   Diagnosis Date     Squamous cell carcinoma      Physical Exam:   Vitals: BP (!) 141/79   Pulse 54   Temp 97.3  F (36.3  C)   Resp 18   Ht 1.524 m (5')   Wt 69 kg (152 lb 1.6 oz)   SpO2 97%   BMI 29.70 kg/m    BMI= Body mass index is 29.7 kg/m .  GENERAL APPEARANCE:  Alert, in no distress  RESP:  lungs clear to auscultation , no respiratory distress  CV:  regular rate and rhythm, no murmur, rub, or gallop, no edema  ABDOMEN:  normal bowel sounds, soft, nontender, no hepatosplenomegaly or other masses  SKIN:  Inspection of skin and subcutaneous tissue baseline, good cms left fingers  PSYCH:  oriented X 3, anxious     DISCHARGE PLAN:    Follow up labs: Per PCP    Medical Follow Up:     Follow up with primary care provider in 3-7 days  [facility] scheduled appointments: None.    Discharge Services: Home Care: Occupational Therapy, Physical Therapy, Registered Nurse, Home Health Aide, and . Outpatient: Occupational Therapy and Physical Therapy. Services to be private pay.    Discharge Instructions Verbalized to Patient at Discharge:     DO NOT DRIVE while taking narcotic pain medications.     TOTAL  DISCHARGE TIME: Greater than 30 minutes  Electronically signed by:  BETHANY Armas CNP     Documentation of Face to Face and Certification for Home Health Services    I certify that services are/were furnished while this patient was under the care of a physician and that a physician or an allowed non-physician practitioner (NPP), had a face-to-face encounter that meets the physician face-to-face encounter requirements. The encounter was in whole, or in part, related to the primary reason for home health. The patient is confined to his/her home and needs intermittent skilled nursing, physical therapy, speech-language pathology, or the continued need for occupational therapy. A plan of care has been established by a physician and is periodically reviewed by a physician.  Date of Face-to-Face Encounter: 9/23/21.    I certify that, based on my findings, the following services are medically necessary home health services: Nursing, Occupational Therapy, Physical Therapy and Social Work.    My clinical findings support the need for the above skilled services because: Leaving home is medically contraindicated for the following reason(s): Infection risk / immunocompromised state where it is safer for them to receive services in the home...    Patient to re-establish plan of care with their PCP within 7-10 days after leaving the facility to reestablish care.  Medicare certified PECOS provider: BETHANY Armas CNP  Date: September 23, 2021        Sincerely,        BETHANY Armas CNP

## 2022-11-10 ENCOUNTER — LAB REQUISITION (OUTPATIENT)
Dept: LAB | Facility: CLINIC | Age: 87
End: 2022-11-10
Payer: MEDICARE

## 2022-11-10 DIAGNOSIS — R60.9 EDEMA, UNSPECIFIED: ICD-10-CM

## 2022-11-11 LAB
ANION GAP SERPL CALCULATED.3IONS-SCNC: 5 MMOL/L (ref 3–14)
BUN SERPL-MCNC: 14 MG/DL (ref 7–30)
CALCIUM SERPL-MCNC: 9.2 MG/DL (ref 8.5–10.1)
CHLORIDE BLD-SCNC: 96 MMOL/L (ref 94–109)
CO2 SERPL-SCNC: 31 MMOL/L (ref 20–32)
CREAT SERPL-MCNC: 0.62 MG/DL (ref 0.52–1.04)
GFR SERPL CREATININE-BSD FRML MDRD: 86 ML/MIN/1.73M2
GLUCOSE BLD-MCNC: 91 MG/DL (ref 70–99)
POTASSIUM BLD-SCNC: 4.4 MMOL/L (ref 3.4–5.3)
SODIUM SERPL-SCNC: 132 MMOL/L (ref 133–144)

## 2022-11-11 PROCEDURE — 80048 BASIC METABOLIC PNL TOTAL CA: CPT | Performed by: INTERNAL MEDICINE

## 2022-11-11 PROCEDURE — P9603 ONE-WAY ALLOW PRORATED MILES: HCPCS | Performed by: INTERNAL MEDICINE

## 2024-01-07 ENCOUNTER — LAB REQUISITION (OUTPATIENT)
Dept: LAB | Facility: CLINIC | Age: 89
End: 2024-01-07
Payer: MEDICARE

## 2024-01-07 DIAGNOSIS — I48.0 PAROXYSMAL ATRIAL FIBRILLATION (H): ICD-10-CM

## 2024-01-08 LAB — INR PPP: 1.23 (ref 0.85–1.15)

## 2024-01-08 PROCEDURE — 36415 COLL VENOUS BLD VENIPUNCTURE: CPT | Mod: ORL | Performed by: NURSE PRACTITIONER

## 2024-01-08 PROCEDURE — P9603 ONE-WAY ALLOW PRORATED MILES: HCPCS | Mod: ORL | Performed by: NURSE PRACTITIONER

## 2024-01-08 PROCEDURE — 85610 PROTHROMBIN TIME: CPT | Mod: ORL | Performed by: NURSE PRACTITIONER

## 2024-01-11 ENCOUNTER — LAB REQUISITION (OUTPATIENT)
Dept: LAB | Facility: CLINIC | Age: 89
End: 2024-01-11
Payer: MEDICARE

## 2024-01-11 DIAGNOSIS — I48.0 PAROXYSMAL ATRIAL FIBRILLATION (H): ICD-10-CM

## 2024-01-12 LAB — INR PPP: 1.56 (ref 0.85–1.15)

## 2024-01-12 PROCEDURE — P9603 ONE-WAY ALLOW PRORATED MILES: HCPCS | Mod: ORL | Performed by: NURSE PRACTITIONER

## 2024-01-12 PROCEDURE — 36415 COLL VENOUS BLD VENIPUNCTURE: CPT | Mod: ORL | Performed by: NURSE PRACTITIONER

## 2024-01-12 PROCEDURE — 85610 PROTHROMBIN TIME: CPT | Mod: ORL | Performed by: NURSE PRACTITIONER

## 2024-01-14 ENCOUNTER — LAB REQUISITION (OUTPATIENT)
Dept: LAB | Facility: CLINIC | Age: 89
End: 2024-01-14
Payer: MEDICARE

## 2024-01-14 DIAGNOSIS — I48.0 PAROXYSMAL ATRIAL FIBRILLATION (H): ICD-10-CM

## 2024-01-15 LAB — INR PPP: 1.83 (ref 0.85–1.15)

## 2024-01-15 PROCEDURE — 36415 COLL VENOUS BLD VENIPUNCTURE: CPT | Mod: ORL | Performed by: NURSE PRACTITIONER

## 2024-01-15 PROCEDURE — P9603 ONE-WAY ALLOW PRORATED MILES: HCPCS | Mod: ORL | Performed by: NURSE PRACTITIONER

## 2024-01-15 PROCEDURE — 85610 PROTHROMBIN TIME: CPT | Mod: ORL | Performed by: NURSE PRACTITIONER

## 2024-01-18 ENCOUNTER — LAB REQUISITION (OUTPATIENT)
Dept: LAB | Facility: CLINIC | Age: 89
End: 2024-01-18
Payer: MEDICARE

## 2024-01-18 DIAGNOSIS — I48.0 PAROXYSMAL ATRIAL FIBRILLATION (H): ICD-10-CM

## 2024-01-19 LAB — INR PPP: 2.04 (ref 0.85–1.15)

## 2024-01-19 PROCEDURE — 36415 COLL VENOUS BLD VENIPUNCTURE: CPT | Mod: ORL | Performed by: NURSE PRACTITIONER

## 2024-01-19 PROCEDURE — P9603 ONE-WAY ALLOW PRORATED MILES: HCPCS | Mod: ORL | Performed by: NURSE PRACTITIONER

## 2024-01-19 PROCEDURE — 85610 PROTHROMBIN TIME: CPT | Mod: ORL | Performed by: NURSE PRACTITIONER

## 2024-01-21 ENCOUNTER — LAB REQUISITION (OUTPATIENT)
Dept: LAB | Facility: CLINIC | Age: 89
End: 2024-01-21
Payer: MEDICARE

## 2024-01-21 DIAGNOSIS — I48.0 PAROXYSMAL ATRIAL FIBRILLATION (H): ICD-10-CM

## 2024-01-22 LAB — INR PPP: 2.09 (ref 0.85–1.15)

## 2024-01-22 PROCEDURE — P9603 ONE-WAY ALLOW PRORATED MILES: HCPCS | Mod: ORL | Performed by: NURSE PRACTITIONER

## 2024-01-22 PROCEDURE — 85610 PROTHROMBIN TIME: CPT | Mod: ORL | Performed by: NURSE PRACTITIONER

## 2024-01-22 PROCEDURE — 36415 COLL VENOUS BLD VENIPUNCTURE: CPT | Mod: ORL | Performed by: NURSE PRACTITIONER

## 2024-01-28 ENCOUNTER — LAB REQUISITION (OUTPATIENT)
Dept: LAB | Facility: CLINIC | Age: 89
End: 2024-01-28
Payer: MEDICARE

## 2024-01-28 DIAGNOSIS — I48.0 PAROXYSMAL ATRIAL FIBRILLATION (H): ICD-10-CM

## 2024-01-29 LAB — INR PPP: 1.9 (ref 0.85–1.15)

## 2024-01-29 PROCEDURE — P9603 ONE-WAY ALLOW PRORATED MILES: HCPCS | Mod: ORL | Performed by: FAMILY MEDICINE

## 2024-01-29 PROCEDURE — 36415 COLL VENOUS BLD VENIPUNCTURE: CPT | Mod: ORL | Performed by: FAMILY MEDICINE

## 2024-01-29 PROCEDURE — 85610 PROTHROMBIN TIME: CPT | Mod: ORL | Performed by: FAMILY MEDICINE

## 2024-02-04 ENCOUNTER — LAB REQUISITION (OUTPATIENT)
Dept: LAB | Facility: CLINIC | Age: 89
End: 2024-02-04
Payer: MEDICARE

## 2024-02-04 DIAGNOSIS — I48.0 PAROXYSMAL ATRIAL FIBRILLATION (H): ICD-10-CM

## 2024-02-05 LAB — INR PPP: 1.8 (ref 0.85–1.15)

## 2024-02-05 PROCEDURE — 85610 PROTHROMBIN TIME: CPT | Mod: ORL | Performed by: NURSE PRACTITIONER

## 2024-02-05 PROCEDURE — P9603 ONE-WAY ALLOW PRORATED MILES: HCPCS | Mod: ORL | Performed by: NURSE PRACTITIONER

## 2024-02-05 PROCEDURE — 36415 COLL VENOUS BLD VENIPUNCTURE: CPT | Mod: ORL | Performed by: NURSE PRACTITIONER

## 2024-02-11 ENCOUNTER — LAB REQUISITION (OUTPATIENT)
Dept: LAB | Facility: CLINIC | Age: 89
End: 2024-02-11
Payer: MEDICARE

## 2024-02-11 DIAGNOSIS — I48.0 PAROXYSMAL ATRIAL FIBRILLATION (H): ICD-10-CM

## 2024-02-12 LAB — INR PPP: 2.25 (ref 0.85–1.15)

## 2024-02-12 PROCEDURE — 85610 PROTHROMBIN TIME: CPT | Mod: ORL | Performed by: NURSE PRACTITIONER

## 2024-02-12 PROCEDURE — P9603 ONE-WAY ALLOW PRORATED MILES: HCPCS | Mod: ORL | Performed by: NURSE PRACTITIONER

## 2024-02-12 PROCEDURE — 36415 COLL VENOUS BLD VENIPUNCTURE: CPT | Mod: ORL | Performed by: NURSE PRACTITIONER

## 2024-02-18 ENCOUNTER — LAB REQUISITION (OUTPATIENT)
Dept: LAB | Facility: CLINIC | Age: 89
End: 2024-02-18
Payer: MEDICARE

## 2024-02-18 DIAGNOSIS — I48.0 PAROXYSMAL ATRIAL FIBRILLATION (H): ICD-10-CM

## 2024-02-19 LAB — INR PPP: 1.57 (ref 0.85–1.15)

## 2024-02-19 PROCEDURE — 36415 COLL VENOUS BLD VENIPUNCTURE: CPT | Mod: ORL | Performed by: NURSE PRACTITIONER

## 2024-02-19 PROCEDURE — P9603 ONE-WAY ALLOW PRORATED MILES: HCPCS | Mod: ORL | Performed by: NURSE PRACTITIONER

## 2024-02-19 PROCEDURE — 85610 PROTHROMBIN TIME: CPT | Mod: ORL | Performed by: NURSE PRACTITIONER

## 2024-02-22 ENCOUNTER — LAB REQUISITION (OUTPATIENT)
Dept: LAB | Facility: CLINIC | Age: 89
End: 2024-02-22
Payer: MEDICARE

## 2024-02-22 DIAGNOSIS — I48.0 PAROXYSMAL ATRIAL FIBRILLATION (H): ICD-10-CM

## 2024-02-23 LAB — INR PPP: 2.09 (ref 0.85–1.15)

## 2024-02-23 PROCEDURE — 36415 COLL VENOUS BLD VENIPUNCTURE: CPT | Mod: ORL | Performed by: NURSE PRACTITIONER

## 2024-02-23 PROCEDURE — P9603 ONE-WAY ALLOW PRORATED MILES: HCPCS | Mod: ORL | Performed by: NURSE PRACTITIONER

## 2024-02-23 PROCEDURE — 85610 PROTHROMBIN TIME: CPT | Mod: ORL | Performed by: NURSE PRACTITIONER

## 2024-02-29 ENCOUNTER — LAB REQUISITION (OUTPATIENT)
Dept: LAB | Facility: CLINIC | Age: 89
End: 2024-02-29
Payer: MEDICARE

## 2024-02-29 DIAGNOSIS — I48.0 PAROXYSMAL ATRIAL FIBRILLATION (H): ICD-10-CM

## 2024-03-01 LAB — INR PPP: 2.12 (ref 0.85–1.15)

## 2024-03-01 PROCEDURE — 36415 COLL VENOUS BLD VENIPUNCTURE: CPT | Mod: ORL | Performed by: NURSE PRACTITIONER

## 2024-03-01 PROCEDURE — P9603 ONE-WAY ALLOW PRORATED MILES: HCPCS | Mod: ORL | Performed by: NURSE PRACTITIONER

## 2024-03-01 PROCEDURE — 85610 PROTHROMBIN TIME: CPT | Mod: ORL | Performed by: NURSE PRACTITIONER

## 2024-03-07 ENCOUNTER — LAB REQUISITION (OUTPATIENT)
Dept: LAB | Facility: CLINIC | Age: 89
End: 2024-03-07
Payer: MEDICARE

## 2024-03-07 DIAGNOSIS — I48.0 PAROXYSMAL ATRIAL FIBRILLATION (H): ICD-10-CM

## 2024-03-08 LAB — INR PPP: 2.14 (ref 0.85–1.15)

## 2024-03-08 PROCEDURE — 36415 COLL VENOUS BLD VENIPUNCTURE: CPT | Mod: ORL | Performed by: NURSE PRACTITIONER

## 2024-03-08 PROCEDURE — P9603 ONE-WAY ALLOW PRORATED MILES: HCPCS | Mod: ORL | Performed by: NURSE PRACTITIONER

## 2024-03-08 PROCEDURE — 85610 PROTHROMBIN TIME: CPT | Mod: ORL | Performed by: NURSE PRACTITIONER

## 2024-03-21 ENCOUNTER — LAB REQUISITION (OUTPATIENT)
Dept: LAB | Facility: CLINIC | Age: 89
End: 2024-03-21
Payer: MEDICARE

## 2024-03-21 DIAGNOSIS — I48.0 PAROXYSMAL ATRIAL FIBRILLATION (H): ICD-10-CM

## 2024-03-22 LAB — INR PPP: 1.99 (ref 0.85–1.15)

## 2024-03-22 PROCEDURE — 85610 PROTHROMBIN TIME: CPT | Mod: ORL | Performed by: NURSE PRACTITIONER

## 2024-03-22 PROCEDURE — 36415 COLL VENOUS BLD VENIPUNCTURE: CPT | Mod: ORL | Performed by: NURSE PRACTITIONER

## 2024-03-22 PROCEDURE — P9603 ONE-WAY ALLOW PRORATED MILES: HCPCS | Mod: ORL | Performed by: NURSE PRACTITIONER

## 2024-03-28 ENCOUNTER — LAB REQUISITION (OUTPATIENT)
Dept: LAB | Facility: CLINIC | Age: 89
End: 2024-03-28
Payer: MEDICARE

## 2024-03-28 DIAGNOSIS — I48.0 PAROXYSMAL ATRIAL FIBRILLATION (H): ICD-10-CM

## 2024-03-29 LAB — INR PPP: 2.04 (ref 0.85–1.15)

## 2024-03-29 PROCEDURE — P9603 ONE-WAY ALLOW PRORATED MILES: HCPCS | Mod: ORL | Performed by: NURSE PRACTITIONER

## 2024-03-29 PROCEDURE — 36415 COLL VENOUS BLD VENIPUNCTURE: CPT | Mod: ORL | Performed by: NURSE PRACTITIONER

## 2024-03-29 PROCEDURE — 85610 PROTHROMBIN TIME: CPT | Mod: ORL | Performed by: NURSE PRACTITIONER

## 2024-04-04 ENCOUNTER — LAB REQUISITION (OUTPATIENT)
Dept: LAB | Facility: CLINIC | Age: 89
End: 2024-04-04
Payer: MEDICARE

## 2024-04-04 DIAGNOSIS — I48.0 PAROXYSMAL ATRIAL FIBRILLATION (H): ICD-10-CM

## 2024-04-05 LAB — INR PPP: 2.31 (ref 0.85–1.15)

## 2024-04-05 PROCEDURE — 85610 PROTHROMBIN TIME: CPT | Mod: ORL | Performed by: NURSE PRACTITIONER

## 2024-04-05 PROCEDURE — 36415 COLL VENOUS BLD VENIPUNCTURE: CPT | Mod: ORL | Performed by: NURSE PRACTITIONER

## 2024-04-05 PROCEDURE — P9603 ONE-WAY ALLOW PRORATED MILES: HCPCS | Mod: ORL | Performed by: NURSE PRACTITIONER

## 2024-04-12 ENCOUNTER — LAB REQUISITION (OUTPATIENT)
Dept: LAB | Facility: CLINIC | Age: 89
End: 2024-04-12
Payer: MEDICARE

## 2024-04-12 DIAGNOSIS — I48.0 PAROXYSMAL ATRIAL FIBRILLATION (H): ICD-10-CM

## 2024-04-12 LAB — INR PPP: 2.35 (ref 0.85–1.15)

## 2024-04-12 PROCEDURE — 85610 PROTHROMBIN TIME: CPT | Mod: ORL | Performed by: NURSE PRACTITIONER

## 2024-04-12 PROCEDURE — P9603 ONE-WAY ALLOW PRORATED MILES: HCPCS | Mod: ORL | Performed by: NURSE PRACTITIONER

## 2024-04-12 PROCEDURE — 36415 COLL VENOUS BLD VENIPUNCTURE: CPT | Mod: ORL | Performed by: NURSE PRACTITIONER

## 2024-04-25 ENCOUNTER — LAB REQUISITION (OUTPATIENT)
Dept: LAB | Facility: CLINIC | Age: 89
End: 2024-04-25
Payer: MEDICARE

## 2024-04-25 DIAGNOSIS — I48.0 PAROXYSMAL ATRIAL FIBRILLATION (H): ICD-10-CM

## 2024-04-26 LAB — INR PPP: 2.88 (ref 0.85–1.15)

## 2024-04-26 PROCEDURE — 85610 PROTHROMBIN TIME: CPT | Mod: ORL | Performed by: NURSE PRACTITIONER

## 2024-04-26 PROCEDURE — P9603 ONE-WAY ALLOW PRORATED MILES: HCPCS | Mod: ORL | Performed by: NURSE PRACTITIONER

## 2024-04-26 PROCEDURE — 36415 COLL VENOUS BLD VENIPUNCTURE: CPT | Mod: ORL | Performed by: NURSE PRACTITIONER

## 2024-05-09 ENCOUNTER — LAB REQUISITION (OUTPATIENT)
Dept: LAB | Facility: CLINIC | Age: 89
End: 2024-05-09
Payer: MEDICARE

## 2024-05-09 DIAGNOSIS — I48.0 PAROXYSMAL ATRIAL FIBRILLATION (H): ICD-10-CM

## 2024-05-10 LAB — INR PPP: 2.38 (ref 0.85–1.15)

## 2024-05-10 PROCEDURE — 85610 PROTHROMBIN TIME: CPT | Mod: ORL | Performed by: NURSE PRACTITIONER

## 2024-05-10 PROCEDURE — 36415 COLL VENOUS BLD VENIPUNCTURE: CPT | Mod: ORL | Performed by: NURSE PRACTITIONER

## 2024-05-10 PROCEDURE — P9603 ONE-WAY ALLOW PRORATED MILES: HCPCS | Mod: ORL | Performed by: NURSE PRACTITIONER

## 2024-05-21 NOTE — NURSING NOTE
Chief Complaint   Patient presents with     Skin Check       Vitals:    11/29/17 1157   BP: (!) 159/95   Pulse: 60   SpO2: 98%     Wt Readings from Last 1 Encounters:   No data found for Wt     Elva Littlejohn LPN.................11/29/2017     Upon review of the In Basket request we have found that we are unable to obtain a copy of the exclusion documentation requested because no response upon requests    Any additional questions or concerns should be emailed to the Practice Liaisons via the appropriate education email address, please do not reply via In Basket.    Thank you  Christina Ying MA

## 2024-05-30 ENCOUNTER — LAB REQUISITION (OUTPATIENT)
Dept: LAB | Facility: CLINIC | Age: 89
End: 2024-05-30
Payer: MEDICARE

## 2024-05-30 DIAGNOSIS — I48.0 PAROXYSMAL ATRIAL FIBRILLATION (H): ICD-10-CM

## 2024-05-31 LAB — INR PPP: 2.62 (ref 0.85–1.15)

## 2024-05-31 PROCEDURE — P9603 ONE-WAY ALLOW PRORATED MILES: HCPCS | Mod: ORL | Performed by: NURSE PRACTITIONER

## 2024-05-31 PROCEDURE — 85610 PROTHROMBIN TIME: CPT | Mod: ORL | Performed by: NURSE PRACTITIONER

## 2024-05-31 PROCEDURE — 36415 COLL VENOUS BLD VENIPUNCTURE: CPT | Mod: ORL | Performed by: NURSE PRACTITIONER

## 2024-06-27 ENCOUNTER — LAB REQUISITION (OUTPATIENT)
Dept: LAB | Facility: CLINIC | Age: 89
End: 2024-06-27
Payer: MEDICARE

## 2024-06-27 DIAGNOSIS — I48.0 PAROXYSMAL ATRIAL FIBRILLATION (H): ICD-10-CM

## 2024-06-28 LAB — INR PPP: 2.38 (ref 0.85–1.15)

## 2024-06-28 PROCEDURE — 36415 COLL VENOUS BLD VENIPUNCTURE: CPT | Mod: ORL | Performed by: NURSE PRACTITIONER

## 2024-06-28 PROCEDURE — P9603 ONE-WAY ALLOW PRORATED MILES: HCPCS | Mod: ORL | Performed by: NURSE PRACTITIONER

## 2024-06-28 PROCEDURE — 85610 PROTHROMBIN TIME: CPT | Mod: ORL | Performed by: NURSE PRACTITIONER

## 2024-07-25 ENCOUNTER — LAB REQUISITION (OUTPATIENT)
Dept: LAB | Facility: CLINIC | Age: 89
End: 2024-07-25
Payer: MEDICARE

## 2024-07-25 DIAGNOSIS — I48.0 PAROXYSMAL ATRIAL FIBRILLATION (H): ICD-10-CM

## 2024-07-26 LAB — INR PPP: 2.59 (ref 0.85–1.15)

## 2024-07-26 PROCEDURE — 36415 COLL VENOUS BLD VENIPUNCTURE: CPT | Mod: ORL | Performed by: NURSE PRACTITIONER

## 2024-07-26 PROCEDURE — P9603 ONE-WAY ALLOW PRORATED MILES: HCPCS | Mod: ORL | Performed by: NURSE PRACTITIONER

## 2024-07-26 PROCEDURE — 85610 PROTHROMBIN TIME: CPT | Mod: ORL | Performed by: NURSE PRACTITIONER

## 2024-08-22 ENCOUNTER — LAB REQUISITION (OUTPATIENT)
Dept: LAB | Facility: CLINIC | Age: 89
End: 2024-08-22
Payer: MEDICARE

## 2024-08-22 DIAGNOSIS — I48.0 PAROXYSMAL ATRIAL FIBRILLATION (H): ICD-10-CM

## 2024-08-23 LAB — INR PPP: 2.47 (ref 0.85–1.15)

## 2024-08-23 PROCEDURE — 36415 COLL VENOUS BLD VENIPUNCTURE: CPT | Mod: ORL | Performed by: NURSE PRACTITIONER

## 2024-08-23 PROCEDURE — P9603 ONE-WAY ALLOW PRORATED MILES: HCPCS | Mod: ORL | Performed by: NURSE PRACTITIONER

## 2024-08-23 PROCEDURE — 85610 PROTHROMBIN TIME: CPT | Mod: ORL | Performed by: NURSE PRACTITIONER

## 2024-09-19 ENCOUNTER — LAB REQUISITION (OUTPATIENT)
Dept: LAB | Facility: CLINIC | Age: 89
End: 2024-09-19
Payer: MEDICARE

## 2024-09-19 DIAGNOSIS — I48.0 PAROXYSMAL ATRIAL FIBRILLATION (H): ICD-10-CM

## 2024-09-19 DIAGNOSIS — I10 ESSENTIAL (PRIMARY) HYPERTENSION: ICD-10-CM

## 2024-09-20 LAB
ANION GAP SERPL CALCULATED.3IONS-SCNC: 11 MMOL/L (ref 7–15)
BUN SERPL-MCNC: 13.1 MG/DL (ref 8–23)
CALCIUM SERPL-MCNC: 9.8 MG/DL (ref 8.8–10.4)
CHLORIDE SERPL-SCNC: 98 MMOL/L (ref 98–107)
CREAT SERPL-MCNC: 0.78 MG/DL (ref 0.51–0.95)
EGFRCR SERPLBLD CKD-EPI 2021: 73 ML/MIN/1.73M2
ERYTHROCYTE [DISTWIDTH] IN BLOOD BY AUTOMATED COUNT: 14.6 % (ref 10–15)
GLUCOSE SERPL-MCNC: 80 MG/DL (ref 70–99)
HCO3 SERPL-SCNC: 26 MMOL/L (ref 22–29)
HCT VFR BLD AUTO: 36.6 % (ref 35–47)
HGB BLD-MCNC: 12.2 G/DL (ref 11.7–15.7)
INR PPP: 2.41 (ref 0.85–1.15)
MCH RBC QN AUTO: 30.5 PG (ref 26.5–33)
MCHC RBC AUTO-ENTMCNC: 33.3 G/DL (ref 31.5–36.5)
MCV RBC AUTO: 92 FL (ref 78–100)
PLATELET # BLD AUTO: 224 10E3/UL (ref 150–450)
POTASSIUM SERPL-SCNC: 4.9 MMOL/L (ref 3.4–5.3)
RBC # BLD AUTO: 4 10E6/UL (ref 3.8–5.2)
SODIUM SERPL-SCNC: 135 MMOL/L (ref 135–145)
WBC # BLD AUTO: 5.1 10E3/UL (ref 4–11)

## 2024-09-20 PROCEDURE — 85027 COMPLETE CBC AUTOMATED: CPT | Mod: ORL | Performed by: NURSE PRACTITIONER

## 2024-09-20 PROCEDURE — P9603 ONE-WAY ALLOW PRORATED MILES: HCPCS | Mod: ORL | Performed by: NURSE PRACTITIONER

## 2024-09-20 PROCEDURE — 36415 COLL VENOUS BLD VENIPUNCTURE: CPT | Mod: ORL | Performed by: NURSE PRACTITIONER

## 2024-09-20 PROCEDURE — 80164 ASSAY DIPROPYLACETIC ACD TOT: CPT | Mod: ORL | Performed by: NURSE PRACTITIONER

## 2024-09-20 PROCEDURE — 80048 BASIC METABOLIC PNL TOTAL CA: CPT | Mod: ORL | Performed by: NURSE PRACTITIONER

## 2024-09-20 PROCEDURE — 85610 PROTHROMBIN TIME: CPT | Mod: ORL | Performed by: NURSE PRACTITIONER

## 2024-09-22 LAB
VALPROATE FREE MFR SERPL: ABNORMAL %
VALPROATE FREE SERPL-MCNC: <7 UG/ML
VALPROATE SERPL-MCNC: 39 UG/ML

## 2024-10-17 ENCOUNTER — LAB REQUISITION (OUTPATIENT)
Dept: LAB | Facility: CLINIC | Age: 89
End: 2024-10-17
Payer: MEDICARE

## 2024-10-17 DIAGNOSIS — I48.0 PAROXYSMAL ATRIAL FIBRILLATION (H): ICD-10-CM

## 2024-10-18 LAB — INR PPP: 2.93 (ref 0.85–1.15)

## 2024-10-18 PROCEDURE — 85610 PROTHROMBIN TIME: CPT | Mod: ORL | Performed by: NURSE PRACTITIONER

## 2024-10-18 PROCEDURE — 36415 COLL VENOUS BLD VENIPUNCTURE: CPT | Mod: ORL | Performed by: NURSE PRACTITIONER

## 2024-10-18 PROCEDURE — P9603 ONE-WAY ALLOW PRORATED MILES: HCPCS | Mod: ORL | Performed by: NURSE PRACTITIONER

## 2024-11-14 ENCOUNTER — LAB REQUISITION (OUTPATIENT)
Dept: LAB | Facility: CLINIC | Age: 89
End: 2024-11-14
Payer: MEDICARE

## 2024-11-14 DIAGNOSIS — I48.0 PAROXYSMAL ATRIAL FIBRILLATION (H): ICD-10-CM

## 2024-11-15 LAB — INR PPP: 2.57 (ref 0.85–1.15)

## 2024-11-15 PROCEDURE — P9603 ONE-WAY ALLOW PRORATED MILES: HCPCS | Mod: ORL | Performed by: NURSE PRACTITIONER

## 2024-11-15 PROCEDURE — 85610 PROTHROMBIN TIME: CPT | Mod: ORL | Performed by: NURSE PRACTITIONER

## 2024-11-15 PROCEDURE — 36415 COLL VENOUS BLD VENIPUNCTURE: CPT | Mod: ORL | Performed by: NURSE PRACTITIONER

## 2024-12-12 ENCOUNTER — LAB REQUISITION (OUTPATIENT)
Dept: LAB | Facility: CLINIC | Age: 89
End: 2024-12-12
Payer: MEDICARE

## 2024-12-12 DIAGNOSIS — I48.0 PAROXYSMAL ATRIAL FIBRILLATION (H): ICD-10-CM

## 2024-12-13 LAB — INR PPP: 2.66 (ref 0.85–1.15)

## 2024-12-13 PROCEDURE — 36415 COLL VENOUS BLD VENIPUNCTURE: CPT | Mod: ORL | Performed by: NURSE PRACTITIONER

## 2024-12-13 PROCEDURE — P9603 ONE-WAY ALLOW PRORATED MILES: HCPCS | Mod: ORL | Performed by: NURSE PRACTITIONER

## 2024-12-13 PROCEDURE — 85610 PROTHROMBIN TIME: CPT | Mod: ORL | Performed by: NURSE PRACTITIONER

## 2024-12-19 PROCEDURE — 87507 IADNA-DNA/RNA PROBE TQ 12-25: CPT | Mod: ORL | Performed by: NURSE PRACTITIONER

## 2024-12-20 ENCOUNTER — LAB REQUISITION (OUTPATIENT)
Dept: LAB | Facility: CLINIC | Age: 89
End: 2024-12-20
Payer: MEDICARE

## 2024-12-20 DIAGNOSIS — A08.19 ACUTE GASTROENTEROPATHY DUE TO OTHER SMALL ROUND VIRUSES: ICD-10-CM

## 2024-12-20 LAB

## 2025-01-09 ENCOUNTER — LAB REQUISITION (OUTPATIENT)
Dept: LAB | Facility: CLINIC | Age: 89
End: 2025-01-09
Payer: MEDICARE

## 2025-01-09 DIAGNOSIS — I48.0 PAROXYSMAL ATRIAL FIBRILLATION (H): ICD-10-CM

## 2025-01-10 LAB — INR PPP: 4.08 (ref 0.85–1.15)

## 2025-01-10 PROCEDURE — P9603 ONE-WAY ALLOW PRORATED MILES: HCPCS | Mod: ORL | Performed by: NURSE PRACTITIONER

## 2025-01-10 PROCEDURE — 36415 COLL VENOUS BLD VENIPUNCTURE: CPT | Mod: ORL | Performed by: NURSE PRACTITIONER

## 2025-01-10 PROCEDURE — 85610 PROTHROMBIN TIME: CPT | Mod: ORL | Performed by: NURSE PRACTITIONER

## 2025-01-12 ENCOUNTER — LAB REQUISITION (OUTPATIENT)
Dept: LAB | Facility: CLINIC | Age: 89
End: 2025-01-12
Payer: MEDICARE

## 2025-01-12 DIAGNOSIS — I48.0 PAROXYSMAL ATRIAL FIBRILLATION (H): ICD-10-CM

## 2025-01-13 LAB — INR PPP: 1.34 (ref 0.85–1.15)

## 2025-01-13 PROCEDURE — 36415 COLL VENOUS BLD VENIPUNCTURE: CPT | Mod: ORL | Performed by: NURSE PRACTITIONER

## 2025-01-13 PROCEDURE — 85610 PROTHROMBIN TIME: CPT | Mod: ORL | Performed by: NURSE PRACTITIONER

## 2025-01-16 ENCOUNTER — LAB REQUISITION (OUTPATIENT)
Dept: LAB | Facility: CLINIC | Age: 89
End: 2025-01-16
Payer: MEDICARE

## 2025-01-16 DIAGNOSIS — I48.0 PAROXYSMAL ATRIAL FIBRILLATION (H): ICD-10-CM

## 2025-01-17 LAB — INR PPP: 1.81 (ref 0.85–1.15)

## 2025-01-17 PROCEDURE — 36415 COLL VENOUS BLD VENIPUNCTURE: CPT | Mod: ORL | Performed by: NURSE PRACTITIONER

## 2025-01-17 PROCEDURE — 85610 PROTHROMBIN TIME: CPT | Mod: ORL | Performed by: NURSE PRACTITIONER

## 2025-01-17 PROCEDURE — P9603 ONE-WAY ALLOW PRORATED MILES: HCPCS | Mod: ORL | Performed by: NURSE PRACTITIONER

## 2025-01-26 ENCOUNTER — LAB REQUISITION (OUTPATIENT)
Dept: LAB | Facility: CLINIC | Age: 89
End: 2025-01-26
Payer: MEDICARE

## 2025-01-26 DIAGNOSIS — I10 ESSENTIAL (PRIMARY) HYPERTENSION: ICD-10-CM

## 2025-01-26 DIAGNOSIS — I48.0 PAROXYSMAL ATRIAL FIBRILLATION (H): ICD-10-CM

## 2025-01-27 LAB
ANION GAP SERPL CALCULATED.3IONS-SCNC: 14 MMOL/L (ref 7–15)
BUN SERPL-MCNC: 12.6 MG/DL (ref 8–23)
CALCIUM SERPL-MCNC: 9.4 MG/DL (ref 8.8–10.4)
CHLORIDE SERPL-SCNC: 96 MMOL/L (ref 98–107)
CREAT SERPL-MCNC: 0.79 MG/DL (ref 0.51–0.95)
EGFRCR SERPLBLD CKD-EPI 2021: 72 ML/MIN/1.73M2
GLUCOSE SERPL-MCNC: 77 MG/DL (ref 70–99)
HCO3 SERPL-SCNC: 23 MMOL/L (ref 22–29)
INR PPP: 1.94 (ref 0.85–1.15)
POTASSIUM SERPL-SCNC: 4.2 MMOL/L (ref 3.4–5.3)
SODIUM SERPL-SCNC: 133 MMOL/L (ref 135–145)

## 2025-01-27 PROCEDURE — P9603 ONE-WAY ALLOW PRORATED MILES: HCPCS | Performed by: NURSE PRACTITIONER

## 2025-01-27 PROCEDURE — 36415 COLL VENOUS BLD VENIPUNCTURE: CPT | Performed by: NURSE PRACTITIONER

## 2025-01-27 PROCEDURE — 85610 PROTHROMBIN TIME: CPT | Performed by: NURSE PRACTITIONER

## 2025-01-27 PROCEDURE — 80048 BASIC METABOLIC PNL TOTAL CA: CPT | Performed by: NURSE PRACTITIONER

## 2025-01-29 ENCOUNTER — LAB REQUISITION (OUTPATIENT)
Dept: LAB | Facility: CLINIC | Age: 89
End: 2025-01-29
Payer: MEDICARE

## 2025-01-29 DIAGNOSIS — N39.0 URINARY TRACT INFECTION, SITE NOT SPECIFIED: ICD-10-CM

## 2025-01-30 ENCOUNTER — LAB REQUISITION (OUTPATIENT)
Dept: LAB | Facility: CLINIC | Age: 89
End: 2025-01-30
Payer: MEDICARE

## 2025-01-30 DIAGNOSIS — R30.0 DYSURIA: ICD-10-CM

## 2025-01-30 DIAGNOSIS — N39.0 URINARY TRACT INFECTION, SITE NOT SPECIFIED: ICD-10-CM

## 2025-01-30 LAB
ALBUMIN UR-MCNC: 50 MG/DL
APPEARANCE UR: ABNORMAL
BACTERIA #/AREA URNS HPF: ABNORMAL /HPF
BILIRUB UR QL STRIP: NEGATIVE
COLOR UR AUTO: YELLOW
GLUCOSE UR STRIP-MCNC: NEGATIVE MG/DL
HGB UR QL STRIP: ABNORMAL
KETONES UR STRIP-MCNC: NEGATIVE MG/DL
LEUKOCYTE ESTERASE UR QL STRIP: ABNORMAL
NITRATE UR QL: NEGATIVE
PH UR STRIP: 6.5 [PH] (ref 5–7)
RBC URINE: >182 /HPF
SP GR UR STRIP: 1.02 (ref 1–1.03)
UROBILINOGEN UR STRIP-MCNC: NORMAL MG/DL
WBC CLUMPS #/AREA URNS HPF: PRESENT /HPF
WBC URINE: >182 /HPF

## 2025-01-30 PROCEDURE — 81001 URINALYSIS AUTO W/SCOPE: CPT | Performed by: NURSE PRACTITIONER

## 2025-01-30 PROCEDURE — 87186 SC STD MICRODIL/AGAR DIL: CPT | Performed by: NURSE PRACTITIONER

## 2025-02-02 ENCOUNTER — LAB REQUISITION (OUTPATIENT)
Dept: LAB | Facility: CLINIC | Age: 89
End: 2025-02-02
Payer: MEDICARE

## 2025-02-02 DIAGNOSIS — I48.0 PAROXYSMAL ATRIAL FIBRILLATION (H): ICD-10-CM

## 2025-02-03 LAB
BACTERIA UR CULT: ABNORMAL
BACTERIA UR CULT: ABNORMAL
INR PPP: 2.4 (ref 0.85–1.15)

## 2025-02-03 PROCEDURE — P9603 ONE-WAY ALLOW PRORATED MILES: HCPCS | Performed by: NURSE PRACTITIONER

## 2025-02-03 PROCEDURE — 85610 PROTHROMBIN TIME: CPT | Performed by: NURSE PRACTITIONER

## 2025-02-06 ENCOUNTER — LAB REQUISITION (OUTPATIENT)
Dept: LAB | Facility: CLINIC | Age: 89
End: 2025-02-06
Payer: MEDICARE

## 2025-02-06 DIAGNOSIS — I48.0 PAROXYSMAL ATRIAL FIBRILLATION (H): ICD-10-CM

## 2025-02-07 LAB — INR PPP: 2.04 (ref 0.85–1.15)

## 2025-02-07 PROCEDURE — 85610 PROTHROMBIN TIME: CPT | Performed by: NURSE PRACTITIONER

## 2025-02-07 PROCEDURE — 36415 COLL VENOUS BLD VENIPUNCTURE: CPT | Mod: ORL | Performed by: NURSE PRACTITIONER

## 2025-02-09 ENCOUNTER — LAB REQUISITION (OUTPATIENT)
Dept: LAB | Facility: CLINIC | Age: 89
End: 2025-02-09
Payer: MEDICARE

## 2025-02-09 DIAGNOSIS — I48.0 PAROXYSMAL ATRIAL FIBRILLATION (H): ICD-10-CM

## 2025-02-10 LAB — INR PPP: 3.06 (ref 0.85–1.15)

## 2025-02-10 PROCEDURE — P9603 ONE-WAY ALLOW PRORATED MILES: HCPCS | Performed by: NURSE PRACTITIONER

## 2025-02-10 PROCEDURE — 85610 PROTHROMBIN TIME: CPT | Performed by: NURSE PRACTITIONER

## 2025-02-10 PROCEDURE — 36415 COLL VENOUS BLD VENIPUNCTURE: CPT | Mod: ORL | Performed by: NURSE PRACTITIONER

## 2025-02-13 ENCOUNTER — LAB REQUISITION (OUTPATIENT)
Dept: LAB | Facility: CLINIC | Age: 89
End: 2025-02-13
Payer: MEDICARE

## 2025-02-13 DIAGNOSIS — I48.0 PAROXYSMAL ATRIAL FIBRILLATION (H): ICD-10-CM

## 2025-02-14 LAB — INR PPP: 2.77 (ref 0.85–1.15)

## 2025-02-14 PROCEDURE — 85610 PROTHROMBIN TIME: CPT | Mod: ORL | Performed by: NURSE PRACTITIONER

## 2025-02-14 PROCEDURE — 36415 COLL VENOUS BLD VENIPUNCTURE: CPT | Performed by: NURSE PRACTITIONER

## 2025-02-14 PROCEDURE — P9603 ONE-WAY ALLOW PRORATED MILES: HCPCS | Performed by: NURSE PRACTITIONER

## 2025-02-20 ENCOUNTER — LAB REQUISITION (OUTPATIENT)
Dept: LAB | Facility: CLINIC | Age: 89
End: 2025-02-20
Payer: MEDICARE

## 2025-02-20 DIAGNOSIS — I48.0 PAROXYSMAL ATRIAL FIBRILLATION (H): ICD-10-CM

## 2025-02-21 LAB — INR PPP: 1.52 (ref 0.85–1.15)

## 2025-02-21 PROCEDURE — 85610 PROTHROMBIN TIME: CPT | Performed by: NURSE PRACTITIONER

## 2025-02-21 PROCEDURE — 36415 COLL VENOUS BLD VENIPUNCTURE: CPT | Performed by: NURSE PRACTITIONER

## 2025-02-23 ENCOUNTER — LAB REQUISITION (OUTPATIENT)
Dept: LAB | Facility: CLINIC | Age: 89
End: 2025-02-23
Payer: MEDICARE

## 2025-02-23 DIAGNOSIS — I48.0 PAROXYSMAL ATRIAL FIBRILLATION (H): ICD-10-CM

## 2025-02-24 LAB — INR PPP: 1.89 (ref 0.85–1.15)

## 2025-02-24 PROCEDURE — 85610 PROTHROMBIN TIME: CPT | Performed by: NURSE PRACTITIONER

## 2025-02-24 PROCEDURE — P9603 ONE-WAY ALLOW PRORATED MILES: HCPCS | Performed by: NURSE PRACTITIONER

## 2025-02-24 PROCEDURE — 36415 COLL VENOUS BLD VENIPUNCTURE: CPT | Mod: ORL | Performed by: NURSE PRACTITIONER

## 2025-03-02 ENCOUNTER — LAB REQUISITION (OUTPATIENT)
Dept: LAB | Facility: CLINIC | Age: 89
End: 2025-03-02
Payer: MEDICARE

## 2025-03-02 DIAGNOSIS — I48.20 CHRONIC ATRIAL FIBRILLATION, UNSPECIFIED (H): ICD-10-CM

## 2025-03-03 LAB — INR PPP: 1.89 (ref 0.85–1.15)

## 2025-03-03 PROCEDURE — P9603 ONE-WAY ALLOW PRORATED MILES: HCPCS | Mod: ORL | Performed by: NURSE PRACTITIONER

## 2025-03-03 PROCEDURE — 36415 COLL VENOUS BLD VENIPUNCTURE: CPT | Performed by: NURSE PRACTITIONER

## 2025-03-03 PROCEDURE — 85610 PROTHROMBIN TIME: CPT | Performed by: NURSE PRACTITIONER

## 2025-03-09 ENCOUNTER — LAB REQUISITION (OUTPATIENT)
Dept: LAB | Facility: CLINIC | Age: 89
End: 2025-03-09
Payer: MEDICARE

## 2025-03-09 DIAGNOSIS — I48.0 PAROXYSMAL ATRIAL FIBRILLATION (H): ICD-10-CM

## 2025-03-10 LAB — INR PPP: 2.43 (ref 0.85–1.15)

## 2025-03-10 PROCEDURE — 36415 COLL VENOUS BLD VENIPUNCTURE: CPT | Mod: ORL | Performed by: NURSE PRACTITIONER

## 2025-03-10 PROCEDURE — P9603 ONE-WAY ALLOW PRORATED MILES: HCPCS | Mod: ORL | Performed by: NURSE PRACTITIONER

## 2025-03-10 PROCEDURE — 85610 PROTHROMBIN TIME: CPT | Mod: ORL | Performed by: NURSE PRACTITIONER

## 2025-03-16 ENCOUNTER — LAB REQUISITION (OUTPATIENT)
Dept: LAB | Facility: CLINIC | Age: 89
End: 2025-03-16
Payer: MEDICARE

## 2025-03-16 DIAGNOSIS — I48.0 PAROXYSMAL ATRIAL FIBRILLATION (H): ICD-10-CM

## 2025-03-17 LAB — INR PPP: 2.7 (ref 0.85–1.15)

## 2025-03-17 PROCEDURE — P9603 ONE-WAY ALLOW PRORATED MILES: HCPCS | Mod: ORL | Performed by: NURSE PRACTITIONER

## 2025-03-17 PROCEDURE — 85610 PROTHROMBIN TIME: CPT | Mod: ORL | Performed by: NURSE PRACTITIONER

## 2025-03-17 PROCEDURE — 36415 COLL VENOUS BLD VENIPUNCTURE: CPT | Mod: ORL | Performed by: NURSE PRACTITIONER

## 2025-03-25 ENCOUNTER — LAB REQUISITION (OUTPATIENT)
Dept: LAB | Facility: CLINIC | Age: 89
End: 2025-03-25
Payer: MEDICARE

## 2025-03-25 DIAGNOSIS — I48.0 PAROXYSMAL ATRIAL FIBRILLATION (H): ICD-10-CM

## 2025-03-26 LAB — INR PPP: 2.63 (ref 0.85–1.15)

## 2025-03-26 PROCEDURE — P9603 ONE-WAY ALLOW PRORATED MILES: HCPCS | Mod: ORL | Performed by: NURSE PRACTITIONER

## 2025-03-26 PROCEDURE — 85610 PROTHROMBIN TIME: CPT | Mod: ORL | Performed by: NURSE PRACTITIONER

## 2025-03-26 PROCEDURE — 36415 COLL VENOUS BLD VENIPUNCTURE: CPT | Mod: ORL | Performed by: NURSE PRACTITIONER

## 2025-04-02 ENCOUNTER — LAB REQUISITION (OUTPATIENT)
Dept: LAB | Facility: CLINIC | Age: 89
End: 2025-04-02
Payer: MEDICARE

## 2025-04-02 DIAGNOSIS — N39.0 URINARY TRACT INFECTION, SITE NOT SPECIFIED: ICD-10-CM

## 2025-04-02 PROCEDURE — 81001 URINALYSIS AUTO W/SCOPE: CPT | Mod: ORL | Performed by: NURSE PRACTITIONER

## 2025-04-02 PROCEDURE — 87186 SC STD MICRODIL/AGAR DIL: CPT | Mod: ORL | Performed by: NURSE PRACTITIONER

## 2025-04-03 LAB
ALBUMIN UR-MCNC: 20 MG/DL
APPEARANCE UR: ABNORMAL
BILIRUB UR QL STRIP: NEGATIVE
COLOR UR AUTO: YELLOW
GLUCOSE UR STRIP-MCNC: NEGATIVE MG/DL
HGB UR QL STRIP: NEGATIVE
KETONES UR STRIP-MCNC: ABNORMAL MG/DL
LEUKOCYTE ESTERASE UR QL STRIP: ABNORMAL
MUCOUS THREADS #/AREA URNS LPF: PRESENT /LPF
NITRATE UR QL: NEGATIVE
PH UR STRIP: 6 [PH] (ref 5–7)
RBC URINE: 5 /HPF
SP GR UR STRIP: 1.02 (ref 1–1.03)
UROBILINOGEN UR STRIP-MCNC: NORMAL MG/DL
WBC CLUMPS #/AREA URNS HPF: PRESENT /HPF
WBC URINE: >182 /HPF

## 2025-04-06 LAB — BACTERIA UR CULT: ABNORMAL

## 2025-04-10 ENCOUNTER — LAB REQUISITION (OUTPATIENT)
Dept: LAB | Facility: CLINIC | Age: 89
End: 2025-04-10
Payer: MEDICARE

## 2025-04-10 DIAGNOSIS — I48.0 PAROXYSMAL ATRIAL FIBRILLATION (H): ICD-10-CM

## 2025-04-11 LAB — INR PPP: 2.51 (ref 0.85–1.15)

## 2025-04-11 PROCEDURE — 36415 COLL VENOUS BLD VENIPUNCTURE: CPT | Mod: ORL | Performed by: NURSE PRACTITIONER

## 2025-04-11 PROCEDURE — 85610 PROTHROMBIN TIME: CPT | Mod: ORL | Performed by: NURSE PRACTITIONER

## 2025-04-15 ENCOUNTER — LAB REQUISITION (OUTPATIENT)
Dept: LAB | Facility: CLINIC | Age: 89
End: 2025-04-15
Payer: MEDICARE

## 2025-04-15 DIAGNOSIS — I10 ESSENTIAL (PRIMARY) HYPERTENSION: ICD-10-CM

## 2025-04-15 DIAGNOSIS — I50.9 HEART FAILURE, UNSPECIFIED (H): ICD-10-CM

## 2025-04-15 DIAGNOSIS — Z51.81 ENCOUNTER FOR THERAPEUTIC DRUG LEVEL MONITORING: ICD-10-CM

## 2025-04-16 LAB
ANION GAP SERPL CALCULATED.3IONS-SCNC: 11 MMOL/L (ref 7–15)
BUN SERPL-MCNC: 13.3 MG/DL (ref 8–23)
CALCIUM SERPL-MCNC: 9.6 MG/DL (ref 8.8–10.4)
CHLORIDE SERPL-SCNC: 95 MMOL/L (ref 98–107)
CREAT SERPL-MCNC: 0.75 MG/DL (ref 0.51–0.95)
DIGOXIN SERPL-MCNC: 0.8 NG/ML (ref 0.6–1.2)
EGFRCR SERPLBLD CKD-EPI 2021: 76 ML/MIN/1.73M2
ERYTHROCYTE [DISTWIDTH] IN BLOOD BY AUTOMATED COUNT: 16.5 % (ref 10–15)
GLUCOSE SERPL-MCNC: 84 MG/DL (ref 70–99)
HCO3 SERPL-SCNC: 25 MMOL/L (ref 22–29)
HCT VFR BLD AUTO: 35.3 % (ref 35–47)
HGB BLD-MCNC: 11.9 G/DL (ref 11.7–15.7)
MCH RBC QN AUTO: 29.3 PG (ref 26.5–33)
MCHC RBC AUTO-ENTMCNC: 33.7 G/DL (ref 31.5–36.5)
MCV RBC AUTO: 87 FL (ref 78–100)
PLATELET # BLD AUTO: 241 10E3/UL (ref 150–450)
POTASSIUM SERPL-SCNC: 4.8 MMOL/L (ref 3.4–5.3)
RBC # BLD AUTO: 4.06 10E6/UL (ref 3.8–5.2)
SODIUM SERPL-SCNC: 131 MMOL/L (ref 135–145)
TSH SERPL DL<=0.005 MIU/L-ACNC: 2 UIU/ML (ref 0.3–4.2)
VALPROATE SERPL-MCNC: 23.1 UG/ML
VIT B12 SERPL-MCNC: 1110 PG/ML (ref 232–1245)
WBC # BLD AUTO: 6.7 10E3/UL (ref 4–11)

## 2025-04-16 PROCEDURE — 80164 ASSAY DIPROPYLACETIC ACD TOT: CPT | Mod: ORL | Performed by: NURSE PRACTITIONER

## 2025-04-16 PROCEDURE — 82607 VITAMIN B-12: CPT | Mod: ORL | Performed by: NURSE PRACTITIONER

## 2025-04-16 PROCEDURE — 80048 BASIC METABOLIC PNL TOTAL CA: CPT | Mod: ORL | Performed by: NURSE PRACTITIONER

## 2025-04-16 PROCEDURE — 80162 ASSAY OF DIGOXIN TOTAL: CPT | Mod: ORL | Performed by: NURSE PRACTITIONER

## 2025-04-16 PROCEDURE — P9603 ONE-WAY ALLOW PRORATED MILES: HCPCS | Mod: ORL | Performed by: NURSE PRACTITIONER

## 2025-04-16 PROCEDURE — 36415 COLL VENOUS BLD VENIPUNCTURE: CPT | Mod: ORL | Performed by: NURSE PRACTITIONER

## 2025-04-16 PROCEDURE — 84443 ASSAY THYROID STIM HORMONE: CPT | Mod: ORL | Performed by: NURSE PRACTITIONER

## 2025-04-16 PROCEDURE — 85027 COMPLETE CBC AUTOMATED: CPT | Mod: ORL | Performed by: NURSE PRACTITIONER

## 2025-04-20 ENCOUNTER — LAB REQUISITION (OUTPATIENT)
Dept: LAB | Facility: CLINIC | Age: 89
End: 2025-04-20
Payer: MEDICARE

## 2025-04-20 DIAGNOSIS — I48.0 PAROXYSMAL ATRIAL FIBRILLATION (H): ICD-10-CM

## 2025-04-21 LAB — INR PPP: 2.95 (ref 0.85–1.15)

## 2025-04-21 PROCEDURE — 85610 PROTHROMBIN TIME: CPT | Mod: ORL | Performed by: NURSE PRACTITIONER

## 2025-04-21 PROCEDURE — P9603 ONE-WAY ALLOW PRORATED MILES: HCPCS | Mod: ORL | Performed by: NURSE PRACTITIONER

## 2025-04-21 PROCEDURE — 36415 COLL VENOUS BLD VENIPUNCTURE: CPT | Mod: ORL | Performed by: NURSE PRACTITIONER

## 2025-04-27 ENCOUNTER — LAB REQUISITION (OUTPATIENT)
Dept: LAB | Facility: CLINIC | Age: 89
End: 2025-04-27
Payer: MEDICARE

## 2025-04-27 DIAGNOSIS — I48.0 PAROXYSMAL ATRIAL FIBRILLATION (H): ICD-10-CM

## 2025-04-28 LAB — INR PPP: 2.2 (ref 0.85–1.15)

## 2025-04-28 PROCEDURE — P9603 ONE-WAY ALLOW PRORATED MILES: HCPCS | Mod: ORL | Performed by: NURSE PRACTITIONER

## 2025-04-28 PROCEDURE — 85610 PROTHROMBIN TIME: CPT | Mod: ORL | Performed by: NURSE PRACTITIONER

## 2025-04-28 PROCEDURE — 36415 COLL VENOUS BLD VENIPUNCTURE: CPT | Mod: ORL | Performed by: NURSE PRACTITIONER

## 2025-05-11 ENCOUNTER — LAB REQUISITION (OUTPATIENT)
Dept: LAB | Facility: CLINIC | Age: 89
End: 2025-05-11
Payer: MEDICARE

## 2025-05-11 DIAGNOSIS — I48.0 PAROXYSMAL ATRIAL FIBRILLATION (H): ICD-10-CM

## 2025-05-12 LAB
INR PPP: 1.76 (ref 0.85–1.15)
PROTHROMBIN TIME: 20.4 SECONDS (ref 11.8–14.8)

## 2025-05-12 PROCEDURE — 85610 PROTHROMBIN TIME: CPT | Mod: ORL | Performed by: NURSE PRACTITIONER

## 2025-05-12 PROCEDURE — 36415 COLL VENOUS BLD VENIPUNCTURE: CPT | Mod: ORL | Performed by: NURSE PRACTITIONER

## 2025-05-12 PROCEDURE — P9603 ONE-WAY ALLOW PRORATED MILES: HCPCS | Mod: ORL | Performed by: NURSE PRACTITIONER

## 2025-05-15 ENCOUNTER — LAB REQUISITION (OUTPATIENT)
Dept: LAB | Facility: CLINIC | Age: 89
End: 2025-05-15
Payer: MEDICARE

## 2025-05-15 DIAGNOSIS — I48.0 PAROXYSMAL ATRIAL FIBRILLATION (H): ICD-10-CM

## 2025-05-16 LAB
INR PPP: 1.53 (ref 0.85–1.15)
PROTHROMBIN TIME: 18.1 SECONDS (ref 11.8–14.8)

## 2025-05-16 PROCEDURE — 36415 COLL VENOUS BLD VENIPUNCTURE: CPT | Mod: ORL | Performed by: NURSE PRACTITIONER

## 2025-05-16 PROCEDURE — 85610 PROTHROMBIN TIME: CPT | Mod: ORL | Performed by: NURSE PRACTITIONER

## 2025-05-18 ENCOUNTER — LAB REQUISITION (OUTPATIENT)
Dept: LAB | Facility: CLINIC | Age: 89
End: 2025-05-18
Payer: MEDICARE

## 2025-05-18 DIAGNOSIS — I48.0 PAROXYSMAL ATRIAL FIBRILLATION (H): ICD-10-CM

## 2025-05-19 LAB
INR PPP: 1.49 (ref 0.85–1.15)
PROTHROMBIN TIME: 17.7 SECONDS (ref 11.8–14.8)

## 2025-05-19 PROCEDURE — P9603 ONE-WAY ALLOW PRORATED MILES: HCPCS | Mod: ORL | Performed by: NURSE PRACTITIONER

## 2025-05-19 PROCEDURE — 85610 PROTHROMBIN TIME: CPT | Mod: ORL | Performed by: NURSE PRACTITIONER

## 2025-05-19 PROCEDURE — 36415 COLL VENOUS BLD VENIPUNCTURE: CPT | Mod: ORL | Performed by: NURSE PRACTITIONER

## 2025-05-20 ENCOUNTER — LAB REQUISITION (OUTPATIENT)
Dept: LAB | Facility: CLINIC | Age: 89
End: 2025-05-20
Payer: MEDICARE

## 2025-05-20 DIAGNOSIS — I48.0 PAROXYSMAL ATRIAL FIBRILLATION (H): ICD-10-CM

## 2025-05-21 LAB
INR PPP: 1.84 (ref 0.85–1.15)
PROTHROMBIN TIME: 21.1 SECONDS (ref 11.8–14.8)

## 2025-05-21 PROCEDURE — 36415 COLL VENOUS BLD VENIPUNCTURE: CPT | Mod: ORL | Performed by: NURSE PRACTITIONER

## 2025-05-21 PROCEDURE — 85610 PROTHROMBIN TIME: CPT | Mod: ORL | Performed by: NURSE PRACTITIONER

## 2025-05-21 PROCEDURE — P9603 ONE-WAY ALLOW PRORATED MILES: HCPCS | Mod: ORL | Performed by: NURSE PRACTITIONER

## 2025-05-22 ENCOUNTER — LAB REQUISITION (OUTPATIENT)
Dept: LAB | Facility: CLINIC | Age: 89
End: 2025-05-22
Payer: MEDICARE

## 2025-05-22 DIAGNOSIS — I48.0 PAROXYSMAL ATRIAL FIBRILLATION (H): ICD-10-CM

## 2025-05-23 LAB
INR PPP: 1.92 (ref 0.85–1.15)
PROTHROMBIN TIME: 21.8 SECONDS (ref 11.8–14.8)

## 2025-05-23 PROCEDURE — 36415 COLL VENOUS BLD VENIPUNCTURE: CPT | Mod: ORL | Performed by: NURSE PRACTITIONER

## 2025-05-23 PROCEDURE — 85610 PROTHROMBIN TIME: CPT | Mod: ORL | Performed by: NURSE PRACTITIONER

## 2025-05-27 ENCOUNTER — LAB REQUISITION (OUTPATIENT)
Dept: LAB | Facility: CLINIC | Age: 89
End: 2025-05-27
Payer: MEDICARE

## 2025-05-27 DIAGNOSIS — I48.0 PAROXYSMAL ATRIAL FIBRILLATION (H): ICD-10-CM

## 2025-05-28 LAB
INR PPP: 2.43 (ref 0.85–1.15)
PROTHROMBIN TIME: 25.7 SECONDS (ref 11.8–14.8)

## 2025-05-28 PROCEDURE — P9603 ONE-WAY ALLOW PRORATED MILES: HCPCS | Mod: ORL | Performed by: NURSE PRACTITIONER

## 2025-05-28 PROCEDURE — 85610 PROTHROMBIN TIME: CPT | Mod: ORL | Performed by: NURSE PRACTITIONER

## 2025-05-28 PROCEDURE — 36415 COLL VENOUS BLD VENIPUNCTURE: CPT | Mod: ORL | Performed by: NURSE PRACTITIONER

## 2025-06-03 ENCOUNTER — LAB REQUISITION (OUTPATIENT)
Dept: LAB | Facility: CLINIC | Age: 89
End: 2025-06-03
Payer: MEDICARE

## 2025-06-03 DIAGNOSIS — I48.0 PAROXYSMAL ATRIAL FIBRILLATION (H): ICD-10-CM

## 2025-06-04 LAB
INR PPP: 2.23 (ref 0.85–1.15)
PROTHROMBIN TIME: 24.4 SECONDS (ref 11.8–14.8)

## 2025-06-04 PROCEDURE — 36415 COLL VENOUS BLD VENIPUNCTURE: CPT | Mod: ORL | Performed by: NURSE PRACTITIONER

## 2025-06-04 PROCEDURE — 85610 PROTHROMBIN TIME: CPT | Mod: ORL | Performed by: NURSE PRACTITIONER

## 2025-06-04 PROCEDURE — P9603 ONE-WAY ALLOW PRORATED MILES: HCPCS | Mod: ORL | Performed by: NURSE PRACTITIONER

## 2025-06-10 ENCOUNTER — LAB REQUISITION (OUTPATIENT)
Dept: LAB | Facility: CLINIC | Age: 89
End: 2025-06-10
Payer: OTHER MISCELLANEOUS

## 2025-06-10 DIAGNOSIS — I48.0 PAROXYSMAL ATRIAL FIBRILLATION (H): ICD-10-CM

## 2025-06-11 LAB
INR PPP: 2.21 (ref 0.85–1.15)
PROTHROMBIN TIME: 23.9 SECONDS (ref 11.8–14.8)

## 2025-06-11 PROCEDURE — 85610 PROTHROMBIN TIME: CPT | Mod: ORL | Performed by: NURSE PRACTITIONER

## 2025-06-11 PROCEDURE — 36415 COLL VENOUS BLD VENIPUNCTURE: CPT | Mod: ORL | Performed by: NURSE PRACTITIONER

## 2025-06-11 PROCEDURE — P9604 ONE-WAY ALLOW PRORATED TRIP: HCPCS | Mod: ORL | Performed by: NURSE PRACTITIONER

## 2025-06-22 ENCOUNTER — LAB REQUISITION (OUTPATIENT)
Dept: LAB | Facility: CLINIC | Age: 89
End: 2025-06-22
Payer: OTHER MISCELLANEOUS

## 2025-06-22 DIAGNOSIS — N39.0 URINARY TRACT INFECTION, SITE NOT SPECIFIED: ICD-10-CM

## 2025-06-22 PROCEDURE — 87086 URINE CULTURE/COLONY COUNT: CPT | Mod: ORL | Performed by: NURSE PRACTITIONER

## 2025-06-22 PROCEDURE — 81001 URINALYSIS AUTO W/SCOPE: CPT | Mod: ORL | Performed by: NURSE PRACTITIONER

## 2025-06-23 LAB
ALBUMIN UR-MCNC: 20 MG/DL
APPEARANCE UR: ABNORMAL
BILIRUB UR QL STRIP: NEGATIVE
COLOR UR AUTO: YELLOW
GLUCOSE UR STRIP-MCNC: NEGATIVE MG/DL
HGB UR QL STRIP: ABNORMAL
HYALINE CASTS: 6 /LPF
KETONES UR STRIP-MCNC: NEGATIVE MG/DL
LEUKOCYTE ESTERASE UR QL STRIP: ABNORMAL
MUCOUS THREADS #/AREA URNS LPF: PRESENT /LPF
NITRATE UR QL: NEGATIVE
PH UR STRIP: 6 [PH] (ref 5–7)
RBC URINE: 17 /HPF
SP GR UR STRIP: 1.02 (ref 1–1.03)
SQUAMOUS EPITHELIAL: 2 /HPF
TRANSITIONAL EPI: 2 /HPF
UROBILINOGEN UR STRIP-MCNC: NORMAL MG/DL
WBC CLUMPS #/AREA URNS HPF: PRESENT /HPF
WBC URINE: >182 /HPF

## 2025-06-24 ENCOUNTER — LAB REQUISITION (OUTPATIENT)
Dept: LAB | Facility: CLINIC | Age: 89
End: 2025-06-24
Payer: OTHER MISCELLANEOUS

## 2025-06-24 DIAGNOSIS — I48.0 PAROXYSMAL ATRIAL FIBRILLATION (H): ICD-10-CM

## 2025-06-25 LAB
BACTERIA UR CULT: ABNORMAL
INR PPP: 3.04 (ref 0.85–1.15)
PROTHROMBIN TIME: 30.4 SECONDS (ref 11.8–14.8)

## 2025-06-25 PROCEDURE — 36415 COLL VENOUS BLD VENIPUNCTURE: CPT | Mod: ORL | Performed by: NURSE PRACTITIONER

## 2025-06-25 PROCEDURE — P9603 ONE-WAY ALLOW PRORATED MILES: HCPCS | Mod: ORL | Performed by: NURSE PRACTITIONER

## 2025-06-25 PROCEDURE — 85610 PROTHROMBIN TIME: CPT | Mod: ORL | Performed by: NURSE PRACTITIONER
